# Patient Record
Sex: FEMALE | Race: WHITE | NOT HISPANIC OR LATINO | Employment: OTHER | URBAN - METROPOLITAN AREA
[De-identification: names, ages, dates, MRNs, and addresses within clinical notes are randomized per-mention and may not be internally consistent; named-entity substitution may affect disease eponyms.]

---

## 2023-03-31 DIAGNOSIS — G44.86 CERVICOGENIC HEADACHE: Primary | ICD-10-CM

## 2023-03-31 RX ORDER — BUTALBITAL, ACETAMINOPHEN AND CAFFEINE 300; 40; 50 MG/1; MG/1; MG/1
1 CAPSULE ORAL EVERY 6 HOURS PRN
Qty: 16 CAPSULE | Refills: 0 | Status: SHIPPED | OUTPATIENT
Start: 2023-03-31 | End: 2023-05-15 | Stop reason: SDUPTHER

## 2023-03-31 RX ORDER — BUTALBITAL, ACETAMINOPHEN AND CAFFEINE 300; 40; 50 MG/1; MG/1; MG/1
CAPSULE ORAL EVERY 6 HOURS
COMMUNITY
Start: 2020-09-15 | End: 2023-03-31 | Stop reason: SDUPTHER

## 2023-04-19 ENCOUNTER — APPOINTMENT (OUTPATIENT)
Dept: PRIMARY CARE | Facility: CLINIC | Age: 63
End: 2023-04-19
Payer: COMMERCIAL

## 2023-05-15 DIAGNOSIS — G44.86 CERVICOGENIC HEADACHE: ICD-10-CM

## 2023-05-15 RX ORDER — BUTALBITAL, ACETAMINOPHEN AND CAFFEINE 300; 40; 50 MG/1; MG/1; MG/1
1 CAPSULE ORAL EVERY 6 HOURS PRN
Qty: 16 CAPSULE | Refills: 0 | Status: SHIPPED | OUTPATIENT
Start: 2023-05-15 | End: 2023-06-26 | Stop reason: SDUPTHER

## 2023-06-26 DIAGNOSIS — G44.86 CERVICOGENIC HEADACHE: ICD-10-CM

## 2023-06-26 RX ORDER — BUTALBITAL, ACETAMINOPHEN AND CAFFEINE 300; 40; 50 MG/1; MG/1; MG/1
1 CAPSULE ORAL EVERY 6 HOURS PRN
Qty: 16 CAPSULE | Refills: 0 | Status: SHIPPED | OUTPATIENT
Start: 2023-06-26 | End: 2023-08-08 | Stop reason: SDUPTHER

## 2023-07-07 LAB
CHOLESTEROL (MG/DL) IN SER/PLAS: 98 MG/DL (ref 0–199)
CHOLESTEROL IN HDL (MG/DL) IN SER/PLAS: 18.4 MG/DL
CHOLESTEROL/HDL RATIO: 5.3
LDL: 46 MG/DL (ref 0–99)
TRIGLYCERIDE (MG/DL) IN SER/PLAS: 166 MG/DL (ref 0–149)
VLDL: 33 MG/DL (ref 0–40)

## 2023-08-04 DIAGNOSIS — R21 RASH: Primary | ICD-10-CM

## 2023-08-04 RX ORDER — FLUCONAZOLE 150 MG/1
150 TABLET ORAL
COMMUNITY
Start: 2022-12-07 | End: 2023-08-08 | Stop reason: SDUPTHER

## 2023-08-04 RX ORDER — CETIRIZINE HYDROCHLORIDE AND PSEUDOEPHEDRINE HYDROCHLORIDE 5; 120 MG/1; MG/1
TABLET, FILM COATED, EXTENDED RELEASE ORAL
COMMUNITY
Start: 2023-07-28

## 2023-08-04 RX ORDER — FLUOCINONIDE GEL 0.5 MG/G
1 GEL TOPICAL 2 TIMES DAILY
COMMUNITY
Start: 2022-12-07 | End: 2023-08-04 | Stop reason: SDUPTHER

## 2023-08-06 RX ORDER — FLUOCINONIDE GEL 0.5 MG/G
1 GEL TOPICAL 2 TIMES DAILY
Qty: 30 G | Refills: 1 | Status: SHIPPED | OUTPATIENT
Start: 2023-08-06

## 2023-08-07 DIAGNOSIS — B37.31 ACUTE CANDIDIASIS OF VULVA AND VAGINA: ICD-10-CM

## 2023-08-07 DIAGNOSIS — G44.86 CERVICOGENIC HEADACHE: ICD-10-CM

## 2023-08-07 DIAGNOSIS — B37.9 YEAST INFECTION: Primary | ICD-10-CM

## 2023-08-07 RX ORDER — NYSTATIN 100000 U/G
CREAM TOPICAL
Qty: 30 G | Refills: 1 | Status: SHIPPED | OUTPATIENT
Start: 2023-08-07 | End: 2023-08-08

## 2023-08-08 RX ORDER — BUTALBITAL, ACETAMINOPHEN AND CAFFEINE 300; 40; 50 MG/1; MG/1; MG/1
1 CAPSULE ORAL EVERY 6 HOURS PRN
Qty: 16 CAPSULE | Refills: 0 | OUTPATIENT
Start: 2023-08-08

## 2023-08-08 RX ORDER — FLUCONAZOLE 150 MG/1
150 TABLET ORAL ONCE
Qty: 1 TABLET | Refills: 0 | Status: SHIPPED | OUTPATIENT
Start: 2023-08-08 | End: 2023-08-08

## 2023-08-08 NOTE — TELEPHONE ENCOUNTER
Patient left a message requesting her meds through our refill line. No reason was given for the fluconazole refill request.     Last seen 12/2022.

## 2023-08-10 DIAGNOSIS — G44.86 CERVICOGENIC HEADACHE: ICD-10-CM

## 2023-08-10 RX ORDER — BUTALBITAL, ACETAMINOPHEN AND CAFFEINE 300; 40; 50 MG/1; MG/1; MG/1
1 CAPSULE ORAL EVERY 6 HOURS PRN
Qty: 16 CAPSULE | Refills: 0 | Status: SHIPPED | OUTPATIENT
Start: 2023-08-10

## 2023-08-14 LAB
ABO GROUP (TYPE) IN BLOOD: NORMAL
ABO GROUP (TYPE) IN BLOOD: NORMAL
ANTIBODY SCREEN: NORMAL
RH FACTOR: NORMAL
RH FACTOR: NORMAL

## 2023-08-16 LAB
BB ANTIBODY IDENTIFICATION: NORMAL
PATH REV-IMMUNOHEMATOLOGY-PR30: NORMAL

## 2023-09-01 PROBLEM — M99.09 SEGMENTAL AND SOMATIC DYSFUNCTION: Status: ACTIVE | Noted: 2023-09-01

## 2023-09-01 PROBLEM — G43.909 HEADACHE, MIGRAINE: Status: ACTIVE | Noted: 2023-09-01

## 2023-09-01 PROBLEM — J30.9 ALLERGIC RHINITIS DUE TO ALLERGEN: Status: ACTIVE | Noted: 2023-09-01

## 2023-09-01 PROBLEM — K21.9 GERD (GASTROESOPHAGEAL REFLUX DISEASE): Status: ACTIVE | Noted: 2023-09-01

## 2023-09-01 PROBLEM — R10.9 ABDOMINAL WALL PAIN: Status: ACTIVE | Noted: 2023-09-01

## 2023-09-01 PROBLEM — M54.50 CHRONIC BILATERAL LOW BACK PAIN WITHOUT SCIATICA: Status: ACTIVE | Noted: 2023-09-01

## 2023-09-01 PROBLEM — G44.229 TENSION HEADACHE, CHRONIC: Status: ACTIVE | Noted: 2023-09-01

## 2023-09-01 PROBLEM — M99.01 CERVICOTHORACIC SOMATIC DYSFUNCTION: Status: ACTIVE | Noted: 2023-09-01

## 2023-09-01 PROBLEM — R11.0 NAUSEA IN ADULT: Status: ACTIVE | Noted: 2023-09-01

## 2023-09-01 PROBLEM — G89.29 CHRONIC BILATERAL LOW BACK PAIN WITHOUT SCIATICA: Status: ACTIVE | Noted: 2023-09-01

## 2023-09-01 PROBLEM — R91.8 PULMONARY NODULES: Status: ACTIVE | Noted: 2023-09-01

## 2023-09-01 PROBLEM — K81.0 CHOLECYSTITIS, ACUTE: Status: ACTIVE | Noted: 2023-09-01

## 2023-09-01 PROBLEM — M41.9 SCOLIOSIS: Status: ACTIVE | Noted: 2023-09-01

## 2023-09-01 PROBLEM — M54.2 NECK PAIN: Status: ACTIVE | Noted: 2023-09-01

## 2023-09-01 PROBLEM — K83.9 BILE LEAK: Status: ACTIVE | Noted: 2023-09-01

## 2023-09-01 PROBLEM — R19.7 DIARRHEA: Status: ACTIVE | Noted: 2023-09-01

## 2023-09-01 PROBLEM — E63.9 POOR DIET: Status: ACTIVE | Noted: 2023-09-01

## 2023-09-01 PROBLEM — D64.9 ANEMIA: Status: ACTIVE | Noted: 2023-09-01

## 2023-09-01 PROBLEM — R16.1 SPLENOMEGALY: Status: ACTIVE | Noted: 2023-09-01

## 2023-09-01 PROBLEM — G44.86 HEADACHE, CERVICOGENIC: Status: ACTIVE | Noted: 2023-09-01

## 2023-09-01 PROBLEM — Z86.2 HISTORY OF LYMPHOPENIA: Status: ACTIVE | Noted: 2023-09-01

## 2023-09-01 PROBLEM — D61.818 PANCYTOPENIA (MULTI): Status: ACTIVE | Noted: 2023-09-01

## 2023-09-01 PROBLEM — M54.6 THORACIC SPINE PAIN: Status: ACTIVE | Noted: 2023-09-01

## 2023-09-01 PROBLEM — E66.3 OVERWEIGHT (BMI 25.0-29.9): Status: ACTIVE | Noted: 2023-09-01

## 2023-09-01 PROBLEM — L03.90 CELLULITIS: Status: ACTIVE | Noted: 2023-09-01

## 2023-09-01 PROBLEM — L30.9 ECZEMA: Status: ACTIVE | Noted: 2023-09-01

## 2023-09-01 PROBLEM — G89.3 CHRONIC PAIN DUE TO MALIGNANT NEOPLASTIC DISEASE: Status: ACTIVE | Noted: 2022-03-22

## 2023-09-01 PROBLEM — C26.1: Status: ACTIVE | Noted: 2023-09-01

## 2023-09-01 PROBLEM — R10.811 RIGHT UPPER QUADRANT ABDOMINAL TENDERNESS WITHOUT REBOUND TENDERNESS: Status: ACTIVE | Noted: 2023-09-01

## 2023-09-01 PROBLEM — K82.2 PERFORATION OF GALLBLADDER: Status: ACTIVE | Noted: 2022-03-23

## 2023-09-01 PROBLEM — B37.31 VAGINAL CANDIDIASIS: Status: ACTIVE | Noted: 2023-09-01

## 2023-09-01 RX ORDER — OXYCODONE HYDROCHLORIDE 10 MG/1
TABLET ORAL
COMMUNITY
Start: 2023-07-28 | End: 2023-10-16 | Stop reason: SDUPTHER

## 2023-09-01 RX ORDER — NYSTATIN 100000 U/G
CREAM TOPICAL
COMMUNITY
Start: 2022-01-03

## 2023-09-01 RX ORDER — OMEPRAZOLE 20 MG/1
1 CAPSULE, DELAYED RELEASE ORAL DAILY PRN
COMMUNITY
Start: 2021-05-03

## 2023-09-01 RX ORDER — OXYCODONE HYDROCHLORIDE 5 MG/1
5 TABLET ORAL EVERY 6 HOURS PRN
COMMUNITY
Start: 2021-12-23 | End: 2024-03-02

## 2023-09-01 RX ORDER — FAMOTIDINE 40 MG/1
1 TABLET, FILM COATED ORAL DAILY
COMMUNITY
Start: 2022-12-12

## 2023-09-01 RX ORDER — ONDANSETRON 4 MG/1
4 TABLET, FILM COATED ORAL EVERY 8 HOURS PRN
COMMUNITY

## 2023-09-01 RX ORDER — DEXAMETHASONE 0.5 MG/5ML
SOLUTION ORAL
COMMUNITY
Start: 2023-07-07

## 2023-09-01 RX ORDER — METOCLOPRAMIDE 5 MG/1
TABLET ORAL
COMMUNITY

## 2023-09-01 RX ORDER — CHLORHEXIDINE GLUCONATE 4 %
LIQUID (ML) TOPICAL
COMMUNITY

## 2023-09-01 RX ORDER — GABAPENTIN 300 MG/1
300 CAPSULE ORAL 2 TIMES DAILY
COMMUNITY
End: 2023-10-27 | Stop reason: SDUPTHER

## 2023-09-01 RX ORDER — GLUC/MSM/COLGN2/HYAL/ANTIARTH3 375-375-20
TABLET ORAL
COMMUNITY

## 2023-09-01 RX ORDER — ONDANSETRON HYDROCHLORIDE 8 MG/1
8 TABLET, FILM COATED ORAL EVERY 8 HOURS
COMMUNITY
Start: 2022-12-07 | End: 2024-05-09 | Stop reason: ALTCHOICE

## 2023-09-01 RX ORDER — ACETAMINOPHEN 500 MG
TABLET ORAL
COMMUNITY

## 2023-09-01 RX ORDER — DOXYCYCLINE 100 MG/1
1 CAPSULE ORAL 2 TIMES DAILY
COMMUNITY
Start: 2023-02-04

## 2023-09-01 RX ORDER — ZINC SULFATE 50(220)MG
CAPSULE ORAL
COMMUNITY

## 2023-09-01 RX ORDER — FLUCONAZOLE 150 MG/1
150 TABLET ORAL
COMMUNITY
Start: 2022-01-03 | End: 2024-05-01 | Stop reason: SDUPTHER

## 2023-09-01 RX ORDER — FOLIC ACID 0.4 MG
TABLET ORAL
COMMUNITY

## 2023-09-01 RX ORDER — ASCORBIC ACID 250 MG
TABLET ORAL
COMMUNITY

## 2023-09-01 RX ORDER — MOXIFLOXACIN HYDROCHLORIDE 400 MG/1
400 TABLET ORAL DAILY
COMMUNITY
Start: 2023-07-28

## 2023-09-01 RX ORDER — DIPHENOXYLATE HYDROCHLORIDE AND ATROPINE SULFATE 2.5; .025 MG/1; MG/1
TABLET ORAL
COMMUNITY
Start: 2023-03-17

## 2023-10-06 ENCOUNTER — APPOINTMENT (OUTPATIENT)
Dept: HEMATOLOGY/ONCOLOGY | Facility: HOSPITAL | Age: 63
End: 2023-10-06
Payer: COMMERCIAL

## 2023-10-10 ENCOUNTER — APPOINTMENT (OUTPATIENT)
Dept: PALLIATIVE MEDICINE | Facility: CLINIC | Age: 63
End: 2023-10-10
Payer: COMMERCIAL

## 2023-10-16 ENCOUNTER — OFFICE VISIT (OUTPATIENT)
Dept: PALLIATIVE MEDICINE | Facility: CLINIC | Age: 63
End: 2023-10-16
Payer: COMMERCIAL

## 2023-10-16 VITALS
BODY MASS INDEX: 23.79 KG/M2 | WEIGHT: 113.32 LBS | SYSTOLIC BLOOD PRESSURE: 121 MMHG | HEART RATE: 99 BPM | HEIGHT: 58 IN | TEMPERATURE: 96.3 F | OXYGEN SATURATION: 98 % | RESPIRATION RATE: 16 BRPM | DIASTOLIC BLOOD PRESSURE: 81 MMHG

## 2023-10-16 DIAGNOSIS — C85.90 NON-HODGKIN LYMPHOMA, UNSPECIFIED, UNSPECIFIED SITE (MULTI): ICD-10-CM

## 2023-10-16 DIAGNOSIS — G89.3 CANCER RELATED PAIN: Primary | ICD-10-CM

## 2023-10-16 DIAGNOSIS — Z51.5 PALLIATIVE CARE ENCOUNTER: ICD-10-CM

## 2023-10-16 PROCEDURE — 99215 OFFICE O/P EST HI 40 MIN: CPT

## 2023-10-16 PROCEDURE — 1036F TOBACCO NON-USER: CPT

## 2023-10-16 RX ORDER — OXYCODONE HYDROCHLORIDE 10 MG/1
10 TABLET ORAL EVERY 4 HOURS PRN
Qty: 150 TABLET | Refills: 0 | Status: SHIPPED | OUTPATIENT
Start: 2023-10-16 | End: 2023-11-15 | Stop reason: SDUPTHER

## 2023-10-16 ASSESSMENT — PAIN SCALES - GENERAL: PAINLEVEL: 7

## 2023-10-16 ASSESSMENT — ENCOUNTER SYMPTOMS
DEPRESSION: 0
OCCASIONAL FEELINGS OF UNSTEADINESS: 0
LOSS OF SENSATION IN FEET: 0

## 2023-10-16 ASSESSMENT — PATIENT HEALTH QUESTIONNAIRE - PHQ9
SUM OF ALL RESPONSES TO PHQ9 QUESTIONS 1 AND 2: 0
2. FEELING DOWN, DEPRESSED OR HOPELESS: NOT AT ALL
1. LITTLE INTEREST OR PLEASURE IN DOING THINGS: NOT AT ALL

## 2023-10-16 NOTE — PROGRESS NOTES
SUPPORTIVE AND PALLIATIVE ONCOLOGY OUTPATIENT FOLLOW-UP      SERVICE DATE: 10/16/2023    Subjective   HISTORY OF PRESENT ILLNESS: Grace Smith is a 63 y.o. 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  Oncology has discussed splenectomy versus radiation to decrease or eliminate the spleen completely.  This has been put on hold due to complications from cholecystectomy.  She had struggled with biliary leaks, which had her in and out of the hospital;  this has improved greatly. Methotrexate has been discontinued r/t side effects.     Pain Assessment:  Pain Score: 8-9/10 at its worst and 4/10 with medication.   Location: abdominal pain and headaches  Description: She reports headaches that started after her last blood transfusion. She said it was not a complete match and she has not been feeling well ever since. She has headaches several days per week that are pressure and aching. She has been taking Fioricet with relief. She continues to have abdominal pain that is aching and sharp. This is not constant. She has been taking oxycodone 10 mg 4-5 times per day and Gabapentin 300 mg BID.    Symptom Assessment:  Pain:somewhat  Headache: somewhat  Dizziness:none  Lack of energy: somewhat. Feels that she is not able to complete any activities without feeling drained.   Difficulty sleeping: none  Worrying: none  Anxiety: a little  Depression: a little  Pain in mouth/swallowing: none  Dry mouth: none  Taste changes: none  Shortness of breath: somewhat with exertion  Lack of appetite: somewhat   Nausea: a little. This is improving overall.   Vomiting: none  Constipation: none  Diarrhea: none  Sore muscles: none  Numbness or tingling in hands/feet/other: none  Weight loss: none  Other: none      Information obtained from: chart review and interview of  "patient  ______________________________________________________________________        Objective        PHYSICAL EXAMINATION   Vital signs reviewed      10/16/2023     3:16 PM   Vitals   Systolic 121   Diastolic 81   Heart Rate 99   Temp 35.7 °C (96.3 °F)   Resp 16   Height (in) 1.48 m (4' 10.27\")    Weight (lb) 113.32   BMI 23.47 kg/m2   BSA (m2) 1.45 m2   Visit Report Report         Physical Exam  Pulmonary:      Effort: Pulmonary effort is normal.   Musculoskeletal:         General: Normal range of motion.   Skin:     General: Skin is warm.   Neurological:      Mental Status: She is alert and oriented to person, place, and time.   Psychiatric:         Mood and Affect: Mood normal.         Behavior: Behavior normal.       ASSESSMENT/PLAN    Pain  Pain is: cancer related pain  Type: somatic  Pain control: well-controlled  Home regimen:   -Continue 300 mg gabapentin 2 times a day. Can't tolerate higher doses; grogginess does seem to be minimal now.  -Continue 10 mg oxycodone as needed every 4 hours.  Using about 4-5 tablets a day.  -Continue Tylenol as needed.  Do not exceed 3000 mg a day.  Intolerances/previously tried:   -Was previously seen by chronic pain and would not like to go back.  -Has appointment with [facility]. Seeing chiropractor there.    Opioid Use  Medication Management:   - OARRS report reviewed with no aberrant behavior; consistent with  prescriptions/records and patient history  - MED 60-75.  Overdose Risk Score 640.   This has been discussed with patient.   - We will continue to closely monitor the patient for signs of prescription misuse including UDS, OARRS review and subjective reports at each visit.  - No concurrent benzodiazepine use   - I am a provider who either is or has consulted and collaborated with a provider certified in Hospice and Palliative Medicine and have conducted a face-face visit and examination for this patient.  - Routine Urine Drug Screen completed 1/6/23 " appropriately positive for prescribed opioids and negative for illicit substances. Repeat yearly.  - Controlled Substance Agreement completed 4/11/23. Renew yearly.  - Specifically discussed that controlled substance prescriptions will only be provided by our group as outlined in the completed agreement  - Prescribed naloxone at hospital discharge in 2/2022  - Red Flags: None     Nausea   Intermittent nausea without vomiting related to  blood transfusion    -Continue Zofran as needed. She would like dissolvable zofran at next fill.   -Stop Prilosec daily due to Methotrexate.  -Can use 20-40mg Pepcid daily for GERD.    Constipation  At risk for constipation related to opioids,  currently not constipated  Usual bowel pattern: daily  Home regimen:   -Continue Senna-S as needed  -At home she keeps regular by drinking apple juice when she is constipated  -Continue Lomotil as needed for diarrhea  -Can use benefiber daily for diarrhea  LBM today     SOB: Only with exertion.  -Use fan on your face when feeling short of breath.    Sleep:  - Gabapentin is effective for sleep    Supportive and Palliative Oncology encounter:  Spoke with patient at bedside  Emotional support provided  Coordination of care  We will continue to follow and address symptoms as needed    Advance Directives  Existence of Advance Directives:Unknown  Decision maker: Surrogate decision maker is daughter Elvira  Code Status: Full code    Next Follow-Up Visit:  Return to clinic in 3 months    Signature and billing  Medical complexity was low level due to due to complexity of problems, extensive data review, and high risk of management/treatment.  Time was spent on the following: Prep Time, Time Directly with Patient/Family/Caregiver, Documentation Time. Total time spent: 40      Data  Diagnostic tests and information reviewed for today's visit:  Conversation with primary team         Some elements copied from Milly peter on 7/18/23, the elements  have been updated and all reflect current decision making from today, 10/16/2023.      Plan of Care discussed with: Patient    SIGNATURE: CHARLES Giron    Contact information:  Supportive and Palliative Oncology  Monday-Friday 8 AM-5 PM  Phone:  [phone], press option #5, then option #1.   Or Epic Secure Chat

## 2023-10-26 ENCOUNTER — APPOINTMENT (OUTPATIENT)
Dept: RADIATION ONCOLOGY | Facility: HOSPITAL | Age: 63
End: 2023-10-26
Payer: COMMERCIAL

## 2023-10-27 DIAGNOSIS — G89.3 CANCER RELATED PAIN: Primary | ICD-10-CM

## 2023-10-27 RX ORDER — GABAPENTIN 300 MG/1
300 CAPSULE ORAL 2 TIMES DAILY
Qty: 60 CAPSULE | Refills: 2 | Status: SHIPPED | OUTPATIENT
Start: 2023-10-27 | End: 2024-01-30 | Stop reason: SDUPTHER

## 2023-11-02 ENCOUNTER — APPOINTMENT (OUTPATIENT)
Dept: RADIATION ONCOLOGY | Facility: HOSPITAL | Age: 63
End: 2023-11-02
Payer: COMMERCIAL

## 2023-11-15 ENCOUNTER — TELEPHONE (OUTPATIENT)
Dept: ADMISSION | Facility: HOSPITAL | Age: 63
End: 2023-11-15
Payer: COMMERCIAL

## 2023-11-15 DIAGNOSIS — G89.3 CANCER RELATED PAIN: ICD-10-CM

## 2023-11-15 RX ORDER — OXYCODONE HYDROCHLORIDE 10 MG/1
10 TABLET ORAL EVERY 4 HOURS PRN
Qty: 150 TABLET | Refills: 0 | Status: SHIPPED | OUTPATIENT
Start: 2023-11-15 | End: 2023-12-08 | Stop reason: SDUPTHER

## 2023-11-15 NOTE — TELEPHONE ENCOUNTER
Patient last seen by CONOR Oviedo on 10/16 with plan to continue oxycodone 10mg every 4 hours PRN. Follow up visit is scheduled for 1/8. OARRS reviewed and no aberrancy noted. Patient last filled oxycodone 10mg #150/30 days on 10/17. Prescription to be pended to provider.

## 2023-12-08 ENCOUNTER — TELEPHONE (OUTPATIENT)
Dept: HEMATOLOGY/ONCOLOGY | Facility: HOSPITAL | Age: 63
End: 2023-12-08
Payer: COMMERCIAL

## 2023-12-08 DIAGNOSIS — G89.3 CANCER RELATED PAIN: ICD-10-CM

## 2023-12-08 RX ORDER — OXYCODONE HYDROCHLORIDE 10 MG/1
10 TABLET ORAL EVERY 4 HOURS PRN
Qty: 150 TABLET | Refills: 0 | Status: SHIPPED | OUTPATIENT
Start: 2023-12-08 | End: 2024-01-05 | Stop reason: SDUPTHER

## 2023-12-08 NOTE — TELEPHONE ENCOUNTER
Per OARRS, last fill was for 130 tabs/21 day supply on 11/17.  Refill to be pended to provider to approve.

## 2023-12-08 NOTE — TELEPHONE ENCOUNTER
Refill request received for Oxycodone 10mg.  Preferred pharmacy is [facility] at [address] [address].  Message sent to Palliative Care team.

## 2024-01-05 ENCOUNTER — TELEPHONE (OUTPATIENT)
Dept: ADMISSION | Facility: HOSPITAL | Age: 64
End: 2024-01-05
Payer: COMMERCIAL

## 2024-01-05 DIAGNOSIS — G89.3 CANCER RELATED PAIN: ICD-10-CM

## 2024-01-05 RX ORDER — OXYCODONE HYDROCHLORIDE 10 MG/1
10 TABLET ORAL EVERY 4 HOURS PRN
Qty: 150 TABLET | Refills: 0 | Status: SHIPPED | OUTPATIENT
Start: 2024-01-05 | End: 2024-01-30 | Stop reason: SDUPTHER

## 2024-01-05 NOTE — TELEPHONE ENCOUNTER
OARRS reviewed and no aberrancy noted. Prescription pended to provider to approve. FUV 1/8 with Milly Oviedo.

## 2024-01-08 ENCOUNTER — APPOINTMENT (OUTPATIENT)
Dept: PALLIATIVE MEDICINE | Facility: CLINIC | Age: 64
End: 2024-01-08
Payer: COMMERCIAL

## 2024-01-16 ENCOUNTER — APPOINTMENT (OUTPATIENT)
Dept: PALLIATIVE MEDICINE | Facility: CLINIC | Age: 64
End: 2024-01-16
Payer: COMMERCIAL

## 2024-01-23 ENCOUNTER — APPOINTMENT (OUTPATIENT)
Dept: PALLIATIVE MEDICINE | Facility: CLINIC | Age: 64
End: 2024-01-23
Payer: COMMERCIAL

## 2024-01-30 ENCOUNTER — TELEPHONE (OUTPATIENT)
Dept: HEMATOLOGY/ONCOLOGY | Facility: CLINIC | Age: 64
End: 2024-01-30

## 2024-01-30 ENCOUNTER — OFFICE VISIT (OUTPATIENT)
Dept: PALLIATIVE MEDICINE | Facility: CLINIC | Age: 64
End: 2024-01-30
Payer: COMMERCIAL

## 2024-01-30 VITALS
HEART RATE: 98 BPM | RESPIRATION RATE: 18 BRPM | BODY MASS INDEX: 23.24 KG/M2 | DIASTOLIC BLOOD PRESSURE: 77 MMHG | OXYGEN SATURATION: 100 % | WEIGHT: 112.21 LBS | TEMPERATURE: 98.1 F | SYSTOLIC BLOOD PRESSURE: 124 MMHG

## 2024-01-30 DIAGNOSIS — Z51.5 PALLIATIVE CARE ENCOUNTER: ICD-10-CM

## 2024-01-30 DIAGNOSIS — G89.3 CANCER RELATED PAIN: ICD-10-CM

## 2024-01-30 PROCEDURE — 99214 OFFICE O/P EST MOD 30 MIN: CPT

## 2024-01-30 PROCEDURE — 1036F TOBACCO NON-USER: CPT

## 2024-01-30 RX ORDER — OXYCODONE HYDROCHLORIDE 10 MG/1
10 TABLET ORAL EVERY 4 HOURS PRN
Qty: 150 TABLET | Refills: 0 | Status: SHIPPED | OUTPATIENT
Start: 2024-01-30 | End: 2024-03-02

## 2024-01-30 RX ORDER — GABAPENTIN 300 MG/1
300 CAPSULE ORAL 2 TIMES DAILY
Qty: 60 CAPSULE | Refills: 2 | Status: SHIPPED | OUTPATIENT
Start: 2024-01-30 | End: 2024-05-01 | Stop reason: SDUPTHER

## 2024-01-30 ASSESSMENT — PAIN SCALES - GENERAL: PAINLEVEL: 0-NO PAIN

## 2024-01-30 NOTE — PROGRESS NOTES
SUPPORTIVE AND PALLIATIVE ONCOLOGY OUTPATIENT FOLLOW-UP      SERVICE DATE: 1/30/2024    Subjective   HISTORY OF PRESENT ILLNESS: Grace Smith is a 63 y.o. 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 Oncology has discussed splenectomy versus radiation to decrease or eliminate the spleen completely.  This has been put on hold due to complications from cholecystectomy.  She had struggled with biliary leaks, which had her in and out of the hospital;  this has improved greatly. Methotrexate has been discontinued r/t side effects.     Unfortunately, she has not followed up with oncology as recommended.         Pain Assessment:  Pain Score: 3/10  Location:  abdominal pain, neck, back (chronic)  Description: dull ache, non radiating   Education:  Current pain regimen     Symptom Assessment:  Pain:a little  Headache: none  Dizziness:none  Lack of energy: a little  Difficulty sleeping: none  Worrying: none  Anxiety: none  Depression: none  Pain in mouth/swallowing: none  Dry mouth: none  Taste changes: none  Shortness of breath: none  Lack of appetite: none   Nausea: none  Vomiting: none  Constipation: none  Diarrhea: none  Sore muscles: none  Numbness or tingling in hands/feet/other: none  Weight loss: none    Information obtained from: interview of patient  ______________________________________________________________________        Objective        PHYSICAL EXAMINATION   Vital Signs:   Vital signs reviewed  Vitals:    01/30/24 1440   BP: 124/77   Pulse: 98   Resp: 18   Temp: 36.7 °C (98.1 °F)   SpO2: 100%        Physical Exam  HENT:      Head: Normocephalic and atraumatic.      Nose: Nose normal.      Mouth/Throat:      Mouth: Mucous membranes are moist.      Pharynx: Oropharynx is clear.   Eyes:      General: Scleral icterus present.      Extraocular Movements: Extraocular movements  intact.      Pupils: Pupils are equal, round, and reactive to light.   Pulmonary:      Effort: Pulmonary effort is normal.   Abdominal:      General: There is distension.      Palpations: There is splenomegaly.      Tenderness: There is no abdominal tenderness.      Comments: firm   Musculoskeletal:         General: No swelling. Normal range of motion.      Cervical back: Normal range of motion and neck supple.   Skin:     General: Skin is warm and dry.      Coloration: Skin is jaundiced.   Neurological:      General: No focal deficit present.      Mental Status: She is alert.   Psychiatric:         Mood and Affect: Mood normal.         Behavior: Behavior normal.         ASSESSMENT/PLAN    Pain  Pain is: cancer related pain  Type: somatic  Pain control: well-controlled  Home regimen:   -Continue 300 mg gabapentin 2 times a day. Can't tolerate higher doses; grogginess does seem to be minimal now.  -Continue 10 mg oxycodone as needed every 4 hours.  Using about 4-5 tablets a day.  Intolerances/previously tried:   -Was previously seen by chronic pain and would not like to go back.     Opioid Use  Medication Management:   - OARRS report reviewed with no aberrant behavior; consistent with  prescriptions/records and patient history  - MED 60-75.  Overdose Risk Score 220.   This has been discussed with patient.   - We will continue to closely monitor the patient for signs of prescription misuse including UDS, OARRS review and subjective reports at each visit.  - No concurrent benzodiazepine use   - I am a provider who either is or has consulted and collaborated with a provider certified in Hospice and Palliative Medicine and have conducted a face-face visit and examination for this patient.  - Routine Urine Drug Screen completed 1/6/23 appropriately positive for prescribed opioids and negative for illicit substances. Repeat yearly. Complete at next visit.   - Controlled Substance Agreement completed 4/11/23. Renew  yearly. Complete at next visit.   - Specifically discussed that controlled substance prescriptions will only be provided by our group as outlined in the completed agreement  - Prescribed naloxone at hospital discharge in 2/2022  - Red Flags: None      Nausea  At risk for nausea without vomiting related to opioids   -Continue Zofran as needed.      Constipation  At risk for constipation related to opioids,  currently not constipated  Usual bowel pattern: daily  Home regimen:   -Continue Senna-S as needed     Sleep:  - Gabapentin is effective for sleep     Supportive and Palliative Oncology encounter:  Spoke with patient at bedside  Emotional support provided  Coordination of care  We will continue to follow and address symptoms as needed     Advance Directives  Existence of Advance Directives:Unknown  Decision maker: Surrogate decision maker is daughter Elvira  Code Status: Full code    Patient presents with jaundice and slight scleral icterus along with abdominal distention/firmness today. She has a history of elevated bilirubin but no recent labs or oncology appointments. ER evaluation was recommended for further evaluation. Discussed potential for life threatening etiology. She declined ER evaluation.  It was advised that she make an appointment with her oncologist ASAMARIALUISA. Dr. Ceja was notified.      Next Follow-Up Visit:  Return to clinic in 3 months     Signature and billing  Medical complexity was moderate level due to due to complexity of problems, extensive data review, and high risk of management/treatment.  Time was spent on the following: Prep Time, Time Directly with Patient/Family/Caregiver, Documentation Time. Total time spent: 30 minutes     Data  Diagnostic tests and information reviewed for today's visit:  Most recent labs and imaging results       Some elements copied from Supportive Oncology note on 10/16/23, the elements have been updated and all reflect current decision making from today,  1/30/2024.      Plan of Care discussed with: Patient and RN    I have personally seen and examined the patient and performed the medical decision-making components.  I have reviewed the Advanced Practice Registered Nurse (APRN) orientee's documentation and verified the findings in the note as written. Edits/additions made where necessary.     SIGNATURE: Radha Francisco, APRN-CNP    Contact information:  Supportive and Palliative Oncology  Monday-Friday 8 AM-5 PM  Phone:  [phone], press option #5, then option #1.   Or Epic Secure Chat

## 2024-01-30 NOTE — TELEPHONE ENCOUNTER
VM  Patient transferred from [facility] staff.  Patient saw Milly Oviedo today at [facility].  She wants patient to see Dr. Ceja next available as soon as possible.  Patient is jaundice and last labs bilirubin was elevated.  Definitely due for lab work.  Please call patient to discuss.

## 2024-01-30 NOTE — TELEPHONE ENCOUNTER
I spoke with Grace, she states that while she was seeing Milly Oviedo today, Milly noticed that the patient had yellowing in the cheeks and she recommended coming in to see Dr. Ceja. Grace states that she has no other new symptoms other than her baseline symptoms like controlled diarrhea and nausea, but she does feel that she is due for blood work since her last bilirubin was elevated back in September. She was agreeable to coming in on 2/1/24 at 10:30 AM with Jian ATKINS. She was appreciative of the call and had no other questions. She is aware that if any new symptoms arise prior to her visit to please call the clinic.

## 2024-02-01 ENCOUNTER — OFFICE VISIT (OUTPATIENT)
Dept: HEMATOLOGY/ONCOLOGY | Facility: CLINIC | Age: 64
End: 2024-02-01
Payer: COMMERCIAL

## 2024-02-01 ENCOUNTER — LAB (OUTPATIENT)
Dept: LAB | Facility: CLINIC | Age: 64
End: 2024-02-01
Payer: COMMERCIAL

## 2024-02-01 VITALS
BODY MASS INDEX: 23.1 KG/M2 | HEART RATE: 101 BPM | OXYGEN SATURATION: 98 % | RESPIRATION RATE: 16 BRPM | SYSTOLIC BLOOD PRESSURE: 120 MMHG | TEMPERATURE: 97.3 F | DIASTOLIC BLOOD PRESSURE: 67 MMHG | WEIGHT: 111.55 LBS

## 2024-02-01 DIAGNOSIS — D61.818 PANCYTOPENIA (MULTI): ICD-10-CM

## 2024-02-01 DIAGNOSIS — C91.Z0 LARGE GRANULAR LYMPHOCYTIC LEUKEMIA (MULTI): ICD-10-CM

## 2024-02-01 DIAGNOSIS — D61.818 PANCYTOPENIA (MULTI): Primary | ICD-10-CM

## 2024-02-01 DIAGNOSIS — D64.9 ANEMIA, UNSPECIFIED TYPE: ICD-10-CM

## 2024-02-01 DIAGNOSIS — D64.9 ANEMIA, UNSPECIFIED TYPE: Primary | ICD-10-CM

## 2024-02-01 LAB
ABO GROUP (TYPE) IN BLOOD: NORMAL
ALBUMIN SERPL BCP-MCNC: 3.8 G/DL (ref 3.4–5)
ALP SERPL-CCNC: 60 U/L (ref 33–136)
ALT SERPL W P-5'-P-CCNC: 7 U/L (ref 7–45)
ANION GAP SERPL CALC-SCNC: 12 MMOL/L (ref 10–20)
ANTIBODY SCREEN: NORMAL
AST SERPL W P-5'-P-CCNC: 27 U/L (ref 9–39)
BASOPHILS # BLD AUTO: 0.01 X10*3/UL (ref 0–0.1)
BASOPHILS NFR BLD AUTO: ABNORMAL %
BILIRUB SERPL-MCNC: 3 MG/DL (ref 0–1.2)
BUN SERPL-MCNC: 15 MG/DL (ref 6–23)
CALCIUM SERPL-MCNC: 8.7 MG/DL (ref 8.6–10.3)
CHLORIDE SERPL-SCNC: 98 MMOL/L (ref 98–107)
CO2 SERPL-SCNC: 28 MMOL/L (ref 21–32)
CREAT SERPL-MCNC: 0.64 MG/DL (ref 0.5–1.05)
EGFRCR SERPLBLD CKD-EPI 2021: >90 ML/MIN/1.73M*2
EOSINOPHIL # BLD AUTO: 0 X10*3/UL (ref 0–0.7)
EOSINOPHIL NFR BLD AUTO: ABNORMAL %
ERYTHROCYTE [DISTWIDTH] IN BLOOD BY AUTOMATED COUNT: 18.4 % (ref 11.5–14.5)
GLUCOSE SERPL-MCNC: 100 MG/DL (ref 74–99)
HCT VFR BLD AUTO: 20.9 % (ref 36–46)
HGB BLD-MCNC: 6.5 G/DL (ref 12–16)
IMM GRANULOCYTES # BLD AUTO: ABNORMAL 10*3/UL
IMM GRANULOCYTES NFR BLD AUTO: ABNORMAL %
LYMPHOCYTES # BLD AUTO: 0.27 X10*3/UL (ref 1.2–4.8)
LYMPHOCYTES NFR BLD AUTO: ABNORMAL %
MCH RBC QN AUTO: 29.3 PG (ref 26–34)
MCHC RBC AUTO-ENTMCNC: 31.1 G/DL (ref 32–36)
MCV RBC AUTO: 94 FL (ref 80–100)
MONOCYTES # BLD AUTO: 0.14 X10*3/UL (ref 0.1–1)
MONOCYTES NFR BLD AUTO: ABNORMAL %
NEUTROPHILS # BLD AUTO: 0.06 X10*3/UL (ref 1.2–7.7)
NEUTROPHILS NFR BLD AUTO: ABNORMAL %
NRBC BLD-RTO: ABNORMAL /100{WBCS}
PLATELET # BLD AUTO: 89 X10*3/UL (ref 150–450)
POLYCHROMASIA BLD QL SMEAR: NORMAL
POTASSIUM SERPL-SCNC: 4.4 MMOL/L (ref 3.5–5.3)
PROT SERPL-MCNC: 6.4 G/DL (ref 6.4–8.2)
RBC # BLD AUTO: 2.22 X10*6/UL (ref 4–5.2)
RBC MORPH BLD: NORMAL
RH FACTOR (ANTIGEN D): NORMAL
SODIUM SERPL-SCNC: 134 MMOL/L (ref 136–145)
WBC # BLD AUTO: 0.5 X10*3/UL (ref 4.4–11.3)

## 2024-02-01 PROCEDURE — 86880 COOMBS TEST DIRECT: CPT

## 2024-02-01 PROCEDURE — 36415 COLL VENOUS BLD VENIPUNCTURE: CPT

## 2024-02-01 PROCEDURE — 99214 OFFICE O/P EST MOD 30 MIN: CPT | Mod: 27

## 2024-02-01 PROCEDURE — 85025 COMPLETE CBC W/AUTO DIFF WBC: CPT

## 2024-02-01 PROCEDURE — 80053 COMPREHEN METABOLIC PANEL: CPT

## 2024-02-01 PROCEDURE — 86922 COMPATIBILITY TEST ANTIGLOB: CPT

## 2024-02-01 PROCEDURE — 1036F TOBACCO NON-USER: CPT

## 2024-02-01 PROCEDURE — 86901 BLOOD TYPING SEROLOGIC RH(D): CPT

## 2024-02-01 PROCEDURE — 99214 OFFICE O/P EST MOD 30 MIN: CPT

## 2024-02-01 PROCEDURE — 86900 BLOOD TYPING SEROLOGIC ABO: CPT

## 2024-02-01 RX ORDER — DIPHENHYDRAMINE HYDROCHLORIDE 50 MG/ML
50 INJECTION INTRAMUSCULAR; INTRAVENOUS AS NEEDED
Status: CANCELLED | OUTPATIENT
Start: 2024-02-01

## 2024-02-01 RX ORDER — ALBUTEROL SULFATE 0.83 MG/ML
3 SOLUTION RESPIRATORY (INHALATION) AS NEEDED
Status: CANCELLED | OUTPATIENT
Start: 2024-02-01

## 2024-02-01 RX ORDER — HEPARIN SODIUM,PORCINE/PF 10 UNIT/ML
50 SYRINGE (ML) INTRAVENOUS AS NEEDED
OUTPATIENT
Start: 2024-02-02

## 2024-02-01 RX ORDER — EPINEPHRINE 0.3 MG/.3ML
0.3 INJECTION SUBCUTANEOUS EVERY 5 MIN PRN
Status: CANCELLED | OUTPATIENT
Start: 2024-02-01

## 2024-02-01 RX ORDER — ACETAMINOPHEN 325 MG/1
650 TABLET ORAL ONCE
Status: CANCELLED | OUTPATIENT
Start: 2024-02-01

## 2024-02-01 RX ORDER — DIPHENHYDRAMINE HCL 25 MG
25 CAPSULE ORAL ONCE
Status: CANCELLED | OUTPATIENT
Start: 2024-02-01

## 2024-02-01 RX ORDER — HEPARIN 100 UNIT/ML
500 SYRINGE INTRAVENOUS AS NEEDED
OUTPATIENT
Start: 2024-02-02

## 2024-02-01 RX ORDER — FAMOTIDINE 10 MG/ML
20 INJECTION INTRAVENOUS ONCE AS NEEDED
Status: CANCELLED | OUTPATIENT
Start: 2024-02-01

## 2024-02-01 ASSESSMENT — PAIN SCALES - GENERAL: PAINLEVEL: 5

## 2024-02-01 NOTE — PROGRESS NOTES
Patient ID: Grace Smith is a 63 y.o. female.    Subjective    HPI    Chief Complaint: Follow-up for pancytopenia   Interval History:    Ms. Grace Smith is a 64 y/o F who presents for follow-up for pancytopenia.   Blood work from 8/2/2022, showed stable cytopenias. White count 1.1, hemoglobin 9.9, and platelets 160,000. No differential from that time.   Patient is feeling well since her surgery. She denies any pain. She completed antibiotics 1 week ago and had her PICC line out last Friday. She takes omeprazole as needed for GI issues. Patient initially had leg swelling post surgery which resolved with  increased protein in her diet and compression stockings. No recent fevers, chills, nausea, vomiting, diarrhea, coughing, chest pain, or skin changes.    Interval Events  2/3/2023: Her ANC today is 150. Patient reports feeling exhausted. She notes being scratched by her puppy and is worried she will get an infection. Of note, she has had chronic diarrhea since her cholecystectomy.   3/17/2023: Today, patient reports having new aching foot pain that does become quite intense. The pain lasts for a few minutes then resolves. She is no longer on antibiotics, amoxicillin, for a recent sinus infection. She now feels better. Due to the  infection she delayed starting the methotrexate. Patient has been taking this for 2 weeks now. She does experience N/D and is taking Zofran and lomotil. Of note, is scheduled to see an infectious disease specialist next week.    4/15/2023: Phone Visit: Since last visit Grace was instructed by our office to increase her methotrexate to 15 mg once a week on 3/31/2023. Today she reports that she is doing well, however when she was prescribed doxycycline on 3/30/2023 by urgent care  (after a fall in her driveway due to tripping with subsequent knee abrasion that was inflamed) she was overall was not feeling well, she looked it up and saw that there is an interaction where  the methotrexate serum concentration can increase when taking  it with doxycycline.  Since stopping the doxycycline after the 10 day course she is feeling at her baseline.  She feels taking the antibiotic has led to her decrease in her ANC and white blood cell count. She is not having any fevers or chills, lightheaded/dizziness,  restlessness or confusion. Her nausea is stable and continues to be controlled with Zofran and diarrhea controlled with Lomotil.  She has established care with an infectious disease doctor but at this time there are no needed interventions. Her pain remains stable as well. She is under pain control management with [facility] care.    4/27/2023: A telephone visit (audio only) between the patient at home and the provider at [facility] was utilized to provide this telehealth service.  Patient presents for follow-up for pancytopenia. Currently on methotrexate. Blood work on 4/26/2023 shows WBC 0.8, hemoglobin 11.5, and platelets normal at 118. ANC is 0.38.   Since last visit, patient reports right-sided flank pain for the last 2 days. She states that the pain is stable. Denies any injury or trauma. She also reports increased fatigue for the last 4 days.     5/15/2023: A telephone visit (audio only) between the patient at home and the provider at [facility] was utilized to provide this telehealth service.  Patient presents for follow-up for pancytopenia. Blood work on 5/11/2023 showed a WBC of 1.2 with an absolute neutrophil count of 440. Hemoglobin is normal at 12.5. Platelets are improving to 117,000. Patient also had a urine test on 5/11/2203, which  did not show any evidence of infection. Of note, she did have a bilateral renal ultrasound on 5/5/2203 due to having new right-sided kidney pain and upper quadrant abdominal pain. No splenomegaly with unremarkable kidneys. Spleen enlarged to 22.2 cm.   Since last visit, patient reports that she is having  "urinary urgency. She also reports feeling fatigued. States she takes vitamin B12 daily and folate weekly. She is interested in being tested for COVID antibodies. No other complaints today.     6/12/23: A telephone visit (audio only) between the patient at home and the provider at [facility] was utilized to provide this telehealth service.  Patient presents for follow-up for pancytopenia. Most recent blood work on 6/10/2023, shows hemoglobin of 10.6, WBC 0.9 and neutrophils 240.   Since last visit, patient reports that she has felt \"crummy\" and exhausted on methotrexate. She reports new mouth sores that are affecting her appetite. She states that she continues to have migraines and nausea and diarrhea. She is treating with Zofran  and Imodium.    7/26/2023: A telephone visit (audio only) between the patient at home and the provider at [facility] was utilized to provide this telehealth service.  Patient presents for follow-up for pancytopenia. Since last visit, patient had a bone marrow biopsy on 7/7/2023, which showed increased interstitial CD8 positive T-cell lymphocytosis and similar to recent splenic biopsy. 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   Patient is requesting repeat scans with CT C/A/P. She would like a second opinion from Hematology. She would also like to meet with someone from [facility].     2/1/2024:   Patient presents for problem focused visit.  She saw palliative care 2 days ago who contacted us regarding the patient appearing jaundice.  Patient states that she has felt overall about the same since her last visit with our office which was July 2023.  She saw Dr. Tao in August who referred patient to radiation oncology, patient states she did not want to go and see the provider due to location ([facility]) and has not followed up with Dr. Tao.  Patient's symptoms include " fatigue, decreased appetite, early satiety, occasional night sweats not fully drenching, abdominal pain and back pain, and some shortness of breath with exertion.  She denies fevers, chills, falls, edema, cough, shortness of breath at rest or skin changes.  She denies recent infections, melena, hematochezia or other signs of bleeding.     ROS 14 points performed, See HPI for exceptions     PMHx: Scoliosis, dry eye syndrome    PSHx: S/p tonsillectomy, s/p cholecystectomy 3/18/2022     Social Hx: , no hx of smoking, illicit drug or EtOH use. Daughter is an L/D nurse at [employer]. No h/o occupational chemical exposures. She denies any history of substance misuse. 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 She  has 2 children and 4 stepchildren. She enjoys being outdoors, camping, reading, researching, art, and animals.     FMHx: +cyclic neutropenia (father, [place] age) and uncle. No hx of malignancies.      Objective    Visit Vitals  /67 (BP Location: Right arm)   Pulse 101   Temp 36.3 °C (97.3 °F)   Resp 16   Wt 50.6 kg (111 lb 8.8 oz)   SpO2 98%   BMI 23.10 kg/m²   Smoking Status Never   BSA 1.44 m²         Physical Exam  Constitutional:       General: She is not in acute distress.     Appearance: Normal appearance.   HENT:      Head: Normocephalic.      Mouth/Throat:      Mouth: Mucous membranes are moist.      Pharynx: No oropharyngeal exudate or posterior oropharyngeal erythema.   Eyes:      General: No scleral icterus.     Pupils: Pupils are equal, round, and reactive to light.   Cardiovascular:      Rate and Rhythm: Normal rate and regular rhythm.   Pulmonary:      Effort: Pulmonary effort is normal. No respiratory distress.      Breath sounds: Normal breath sounds. No wheezing.   Abdominal:      General: Bowel sounds are normal.      Comments: Enlarged Spleen   Musculoskeletal:         General: Normal range of motion.      Cervical back: Normal range of motion and neck supple.  "  Skin:     General: Skin is warm and dry.   Neurological:      General: No focal deficit present.      Mental Status: She is alert and oriented to person, place, and time. Mental status is at baseline.      Motor: No weakness.      Gait: Gait normal.   Psychiatric:         Mood and Affect: Mood normal.         Behavior: Behavior normal.         Judgment: Judgment normal.         Performance Status:  Symptomatic; fully ambulatory      Assessment/Plan     1. Pancytopenia   2. Splenomegaly   - Pancytopenia since at least 9/11/2020 (WBC 1.3k, hgb 10.5 with MCV=87; plt= 144k; on 10/2: WBC 1.2k with an ANC= 620, plt= 118k and hgb 8.9); CT abd/pelvis on 9/16/2020 revealed: \"Marked splenomegaly of uncertain etiology. There is also mild hepatomegaly.   - Bone marrow biopsy from 9/23/2020 revealed \"HYPERCELLULAR MARROW WITH ERYTHROID DOMINANT MATURING TRILINEAGE HEMATOPOIESIS WITH MILD DYSERYTHROPOIESIS, MILD FIBROSIS AND AN ATYPICAL T LYMPHOID INFILTRATE, NON-DIAGNOSTIC.\"  - Subsequent testing for HIV Ab, hep C Ab, and hep B (S Ag, Ab and coreAb) and CMV (IgG and IgM) all came back negative. EBV titer results came back as suggestive of a previous infection.  In addition, doppler uls of her RUQ on 10/2020 was unrevealing (\"Cholelithiasis. Unremarkable appearance of the liver. Severe splenomegaly. No definite hemodynamic changes to indicate portal hypertension. No evidence of portal venous thrombosis\") .   - She also had a PET/CT which revealed: \"Redemonstration of splenomegaly with mild hypermetabolic activity identified in the spleen and the bone marrow. Findings are nonspecific but can be seen in setting  of a lymphomatous process. However there is no focal hypermetabolic activity identified to suggest a neoplastic process\".  - Dr. Edmond previously told the pt and daughter that a splenectomy would probably be both diagnostic and therapeutic. In 2/2021 saw Dr. Tovar from surgery, who recommended that she consider having  " "a splenectomy; however, the pt has decided against this due to concerns about recovery from the procedure with her scoliosis. In addition, she has declined to receive pre-splenectomy vaccinations (due to concerns about some of the \"ingredients\"; also,  declined to receive COVID vaccinations).   - s/p cholecystectomy on 3/18/2022 that was complicated by abscess formation requiring drain and stent placement.  - She had a prolonged hospitalization and was eventually discharged with IV antibiotics. Is planned to continue until 5/15/2022.  - We discussed regarding her LGL, we will see where her counts fall s/p antibiotics and recovery from recent infections as this is likely suppressing her marrow in addition to underlying bone marrow process.  - Discussed treatment options for LGL and in her case, we would consider Methotrexate weekly to try to improve neutrophil count.  - We discussed overall, treatment would be supportive to try to prevent infections and need for transfusions.  - Ideally, her diagnosis has been unclear and we would have further tissue.  - Regarding her splenomegaly, she does have options with ablation therapy with IR and will reach out to  Dr. eVrnon.  - She is still very reluctant to undergo splenectomy although we have repeatedly discussed the benefit to understand her pathology better and alleviate sx from splenomegaly.  - 8/11/2022 - She has been off of antibiotics for 1 week with no recent infections.  - Counts remain stable.  - At this point, we discussed that I would be comfortable starting treatment for her LGL, however, she would like to consider possible ablation to the spleen and we will refer to Dr. Vernon who was going to see her initially. We will provide a face sheet  of his info today.  - Discussed that treatment would be methotrexate. We briefly went over side effects, however we will let her recover from her spleen procedure first.  - 2/3/2023 - started weekly methotrexate  - 7/7/23 " repeat bone marrow biopsy, still most consistent with LGL     7/26/23: Telephone call   - Patient is requesting CT scans. We will plan for CT C/A/P to assess spleen and her history of pulmonary nodules.   - Cytopenias remain unchanged.  - We reviewed bone marrow biopsy from 7/7/23 which does show most consistent with LGL. Previously tried methotrexate with no improvement and did not like side effects.   - She is requesting a second opinion form another hematologist We will refer to Dr. Tao.   - She also would like to meet with a functional medicine doctor, [facility] as she is not sure if she would like next line of treatment. We will refer to Dr. Pearson.   - still adamant against splenectomy  - RTC TBD.     2/1/2024:  - Presents for problem focused visit; Lester reported by palliative care provider two days ago   - Labs today: WBC 0.5, ANC 0.06, hgb 6.5, plt 89,000, creatinine 0.64, total bilirubin 3.0  - Saw Dr. Tao in Aug. 2023, decided to not have consult with rad/onc and has not followed up with Dr. Tao or our office since about that time  - Current symptoms include fatigue, early satiety, JAIME, abdominal and back pain, appetite changes  - Discussed with patient that we can refer her to a local radiation oncologist but she declines, states that she does not want to do any further appointments other than with Dr. Ceja, and plans to have her daughter accommodate her  - Hgb 6.5 we will arrange for 1 unit of pRBC transfusion   - We will have her RTC in 1 week to discuss labs/treatment plan with Dr. Ceja   - We did discuss risk of infection/neutropenia and to call right away for any signs or symptoms of infection   - She'd also like a referral to integrative oncology to discuss vitamins and supplements      2. Diarrhea  - Chronic for several years.  - Stool studies negative for pathogen, parasite.     3. Elevated LDH  - Likely related to pancytopenia, however no clear etiology at this time.     4. Mouth  sores  6/12/23:  - Patient is starting to have mouth sores likely secondary to severe neutropenia.   - Discussed mouth wash which we will prescribe today in combination with doxycycline 100 mg twice a day for 7 days.  - She will call us with any worsening symptoms.   - She knows to go to ER with any signs of fevers or chills.   - RTC 2 weeks after bone marrow biopsy.         Transfusion ordered for hgb 6.5  RTC 1 week to see Dr Ceja        Diagnoses and all orders for this visit:  Pancytopenia (CMS/HCC)  -     CBC and Auto Differential; Future  -     Comprehensive Metabolic Panel; Future  -     Referral to [facility]; Future  Large granular lymphocytic leukemia (CMS/HCC)  -     Referral to [facility]; Future  Anemia, unspecified type  -     Referral to [facility]; Future         CONOR Teran-CNP

## 2024-02-02 RX ORDER — DIPHENHYDRAMINE HCL 25 MG
25 CAPSULE ORAL ONCE
OUTPATIENT
Start: 2024-02-02

## 2024-02-02 RX ORDER — ALBUTEROL SULFATE 0.83 MG/ML
3 SOLUTION RESPIRATORY (INHALATION) AS NEEDED
OUTPATIENT
Start: 2024-02-02

## 2024-02-02 RX ORDER — ACETAMINOPHEN 325 MG/1
650 TABLET ORAL ONCE
OUTPATIENT
Start: 2024-02-02

## 2024-02-02 RX ORDER — DIPHENHYDRAMINE HYDROCHLORIDE 50 MG/ML
50 INJECTION INTRAMUSCULAR; INTRAVENOUS AS NEEDED
OUTPATIENT
Start: 2024-02-02

## 2024-02-02 RX ORDER — EPINEPHRINE 0.3 MG/.3ML
0.3 INJECTION SUBCUTANEOUS EVERY 5 MIN PRN
OUTPATIENT
Start: 2024-02-02

## 2024-02-02 RX ORDER — FAMOTIDINE 10 MG/ML
20 INJECTION INTRAVENOUS ONCE AS NEEDED
OUTPATIENT
Start: 2024-02-02

## 2024-02-03 ENCOUNTER — INFUSION (OUTPATIENT)
Dept: HEMATOLOGY/ONCOLOGY | Facility: HOSPITAL | Age: 64
End: 2024-02-03
Payer: COMMERCIAL

## 2024-02-03 VITALS
WEIGHT: 113.1 LBS | HEART RATE: 73 BPM | TEMPERATURE: 97.7 F | BODY MASS INDEX: 23.42 KG/M2 | SYSTOLIC BLOOD PRESSURE: 97 MMHG | OXYGEN SATURATION: 100 % | DIASTOLIC BLOOD PRESSURE: 55 MMHG | RESPIRATION RATE: 18 BRPM

## 2024-02-03 DIAGNOSIS — D64.9 ANEMIA, UNSPECIFIED TYPE: ICD-10-CM

## 2024-02-03 DIAGNOSIS — C91.Z0 LARGE GRANULAR LYMPHOCYTIC LEUKEMIA (MULTI): ICD-10-CM

## 2024-02-03 LAB
BLOOD EXPIRATION DATE: NORMAL
DISPENSE STATUS: NORMAL
PRODUCT BLOOD TYPE: 5100
PRODUCT CODE: NORMAL
UNIT ABO: NORMAL
UNIT NUMBER: NORMAL
UNIT RH: NORMAL
UNIT VOLUME: 274
XM INTEP: NORMAL

## 2024-02-03 PROCEDURE — 86978 RBC PRETREATMENT SERUM: CPT

## 2024-02-03 PROCEDURE — 36430 TRANSFUSION BLD/BLD COMPNT: CPT

## 2024-02-03 PROCEDURE — P9040 RBC LEUKOREDUCED IRRADIATED: HCPCS

## 2024-02-03 PROCEDURE — 86870 RBC ANTIBODY IDENTIFICATION: CPT

## 2024-02-03 PROCEDURE — 2500000001 HC RX 250 WO HCPCS SELF ADMINISTERED DRUGS (ALT 637 FOR MEDICARE OP)

## 2024-02-03 PROCEDURE — 86850 RBC ANTIBODY SCREEN: CPT

## 2024-02-03 RX ORDER — EPINEPHRINE 0.3 MG/.3ML
0.3 INJECTION SUBCUTANEOUS EVERY 5 MIN PRN
Status: DISCONTINUED | OUTPATIENT
Start: 2024-02-03 | End: 2024-02-03 | Stop reason: HOSPADM

## 2024-02-03 RX ORDER — FAMOTIDINE 10 MG/ML
20 INJECTION INTRAVENOUS ONCE AS NEEDED
Status: DISCONTINUED | OUTPATIENT
Start: 2024-02-03 | End: 2024-02-03 | Stop reason: HOSPADM

## 2024-02-03 RX ORDER — ACETAMINOPHEN 325 MG/1
650 TABLET ORAL ONCE
Status: COMPLETED | OUTPATIENT
Start: 2024-02-03 | End: 2024-02-03

## 2024-02-03 RX ORDER — DIPHENHYDRAMINE HYDROCHLORIDE 50 MG/ML
50 INJECTION INTRAMUSCULAR; INTRAVENOUS AS NEEDED
Status: DISCONTINUED | OUTPATIENT
Start: 2024-02-03 | End: 2024-02-03 | Stop reason: HOSPADM

## 2024-02-03 RX ORDER — DIPHENHYDRAMINE HCL 25 MG
25 CAPSULE ORAL ONCE
Status: COMPLETED | OUTPATIENT
Start: 2024-02-03 | End: 2024-02-03

## 2024-02-03 RX ORDER — ALBUTEROL SULFATE 0.83 MG/ML
3 SOLUTION RESPIRATORY (INHALATION) AS NEEDED
Status: DISCONTINUED | OUTPATIENT
Start: 2024-02-03 | End: 2024-02-03 | Stop reason: HOSPADM

## 2024-02-03 RX ADMIN — ACETAMINOPHEN 650 MG: 325 TABLET ORAL at 10:16

## 2024-02-03 RX ADMIN — DIPHENHYDRAMINE HYDROCHLORIDE 25 MG: 25 CAPSULE ORAL at 10:16

## 2024-02-03 ASSESSMENT — PAIN SCALES - GENERAL: PAINLEVEL: 5

## 2024-02-04 LAB
BB ANTIBODY IDENTIFICATION: NORMAL
CASE #: NORMAL

## 2024-02-04 NOTE — PROGRESS NOTES
Patient ID: Grace Smith is a 63 y.o. female.    Subjective    HPI  Ms. Grace Smith is a 62 y/o F who presents for follow-up for pancytopenia.   Blood work from 8/2/2022, showed stable cytopenias. White count 1.1, hemoglobin 9.9, and platelets 160,000. No differential from that time.   Patient is feeling well since her surgery. She denies any pain. She completed antibiotics 1 week ago and had her PICC line out last Friday. She takes omeprazole as needed for GI issues. Patient initially had leg swelling post surgery which resolved with  increased protein in her diet and compression stockings. No recent fevers, chills, nausea, vomiting, diarrhea, coughing, chest pain, or skin changes.     Interval Events    2/9/2024: Patient presents for follow-up for pancytopenia and splenomegaly.   Hgb 7.1. Now with tooth infection. No new fevers, chills. NO new abdominal pain but still with discomfort.       Patient's past medical history, surgical history, family history and social history reviewed.     Objective    BSA: 1.44 meters squared  /74   Pulse 93   Temp 36.7 °C (98.1 °F) (Temporal)   Resp 16   Wt 50.5 kg (111 lb 5.3 oz)   SpO2 97%   BMI 23.05 kg/m²      Review of Systems:   Review of Systems:    Positive per HPI, otherwise negative.     Physical Exam:   Constitutional: Patient appears in no acute distress.   Sitting comfortably in chair.  Eyes: EOMI, clear sclera  ENMT: mucous membranes moist, no apparent injury  Head/Neck: Neck supple, no JVD  Respiratory/Thorax: Patent airways, CTAB, normal  breath sounds, no increased work of breathing  Cardiovascular: Regular, rate and rhythm, no murmurs  Gastrointestinal: massive splenomegaly  Extremities: normal extremities, no cyanosis edema,  no swelling  Neurological: alert and oriented x3, nonfocal, normal  speech and hearing  Lymphatic: No palpable lymphadenopathy in cervical,  axillary  lymph nodes.  Spleen appears normal  "size.  Psychological: Appropriate mood and behavior, normal  affect  Skin: Warm and dry, no lesions, no rashes     Performance Status:  Symptomatic; fully ambulatory    Labs/Imaging/Pathology: personally reviewed reports and images in Epic electronic medical record system. Pertinent results as it related to the plan represented in below in assessment and plan.     Assessment/Plan   1. Pancytopenia   2. Splenomegaly   - Pancytopenia since at least 9/11/2020 (WBC 1.3k, hgb 10.5 with MCV=87; plt= 144k; on 10/2: WBC 1.2k with an ANC= 620, plt= 118k and hgb 8.9); CT abd/pelvis on 9/16/2020 revealed: \"Marked splenomegaly of uncertain etiology. There is also mild hepatomegaly.   - Bone marrow biopsy from 9/23/2020 revealed \"HYPERCELLULAR MARROW WITH ERYTHROID DOMINANT MATURING TRILINEAGE HEMATOPOIESIS WITH MILD DYSERYTHROPOIESIS, MILD FIBROSIS AND AN ATYPICAL T LYMPHOID INFILTRATE, NON-DIAGNOSTIC.\"  - Subsequent testing for HIV Ab, hep C Ab, and hep B (S Ag, Ab and coreAb) and CMV (IgG and IgM) all came back negative. EBV titer results came back as suggestive of a previous infection.  In addition, doppler uls of her RUQ on 10/2020 was unrevealing (\"Cholelithiasis. Unremarkable appearance of the liver. Severe splenomegaly. No definite hemodynamic changes to indicate portal hypertension. No evidence of portal venous thrombosis\") .   - She also had a PET/CT which revealed: \"Redemonstration of splenomegaly with mild hypermetabolic activity identified in the spleen and the bone marrow. Findings are nonspecific but can be seen in setting  of a lymphomatous process. However there is no focal hypermetabolic activity identified to suggest a neoplastic process\".  - Dr. Edmond previously told the pt and daughter that a splenectomy would probably be both diagnostic and therapeutic. In 2/2021 saw Dr. Tovar from surgery, who recommended that she consider having  a splenectomy; however, the pt has decided against this due to concerns " "about recovery from the procedure with her scoliosis. In addition, she has declined to receive pre-splenectomy vaccinations (due to concerns about some of the \"ingredients\"; also,  declined to receive COVID vaccinations).   - s/p cholecystectomy on 3/18/2022 that was complicated by abscess formation requiring drain and stent placement.  - She had a prolonged hospitalization and was eventually discharged with IV antibiotics. Is planned to continue until 5/15/2022.  - We discussed regarding her LGL, we will see where her counts fall s/p antibiotics and recovery from recent infections as this is likely suppressing her marrow in addition to underlying bone marrow process.  - Discussed treatment options for LGL and in her case, we would consider Methotrexate weekly to try to improve neutrophil count.  - We discussed overall, treatment would be supportive to try to prevent infections and need for transfusions.  - Ideally, her diagnosis has been unclear and we would have further tissue.  - Regarding her splenomegaly, she does have options with ablation therapy with IR and will reach out to  Dr. Vernon.  - She is still very reluctant to undergo splenectomy although we have repeatedly discussed the benefit to understand her pathology better and alleviate sx from splenomegaly.  - 8/11/2022 - She has been off of antibiotics for 1 week with no recent infections.  - Counts remain stable.  - At this point, we discussed that I would be comfortable starting treatment for her LGL, however, she would like to consider possible ablation to the spleen and we will refer to Dr. Vernon who was going to see her initially. We will provide a face sheet  of his info today.  - Discussed that treatment would be methotrexate. We briefly went over side effects, however we will let her recover from her spleen procedure first.  - 2/3/2023 - started weekly methotrexate  - 7/7/23 repeat bone marrow biopsy, still most consistent with LGL     7/26/23: " Telephone call   - Patient is requesting CT scans. We will plan for CT C/A/P to assess spleen and her history of pulmonary nodules.   - Cytopenias remain unchanged.  - We reviewed bone marrow biopsy from 7/7/23 which does show most consistent with LGL. Previously tried methotrexate with no improvement and did not like side effects.   - She is requesting a second opinion form another hematologist We will refer to Dr. Tao.   - She also would like to meet with a functional medicine doctor, [facility] as she is not sure if she would like next line of treatment. We will refer to Dr. Pearson.   - still adamant against splenectomy  - RTC TBD.      2/1/2024:  - Presents for problem focused visit; Erichice reported by palliative care provider two days ago   - Labs today: WBC 0.5, ANC 0.06, hgb 6.5, plt 89,000, creatinine 0.64, total bilirubin 3.0  - Saw Dr. Tao in Aug. 2023, decided to not have consult with rad/onc and has not followed up with Dr. Tao or our office since about that time  - Current symptoms include fatigue, early satiety, JAIME, abdominal and back pain, appetite changes  - Discussed with patient that we can refer her to a local radiation oncologist but she declines, states that she does not want to do any further appointments other than with Dr. Ceja, and plans to have her daughter accommodate her  - Hgb 6.5 we will arrange for 1 unit of pRBC transfusion   - We will have her RTC in 1 week to discuss labs/treatment plan with Dr. Ceja   - We did discuss risk of infection/neutropenia and to call right away for any signs or symptoms of infection   - She'd also like a referral to integrative oncology to discuss vitamins and supplements      2/9/24:  -continues to be pancytopenic with massive splenomegaly that is taking up the majority of her abdomen and now with no appetite and weight loss  - she is open to splenic ablation at this point and will discuss with Dr Vernon if this is an option still otherwise  can consider palliative RT  - amoxicillin prescribed for tooth infection  - we discussed ppx abx but she declined at this point and prefers to take as needed  - RTC after splenic ablation      RTC TBD. This note has been transcribed using a medical scribe and there is a possibility of unintentional typing misprints.      Diagnoses and all orders for this visit:  Chemotherapy induced nausea and vomiting  -     ondansetron ODT (Zofran-ODT) 8 mg disintegrating tablet; Take 1 tablet (8 mg) by mouth every 8 hours if needed for nausea or vomiting.  -     amoxicillin (Amoxil) 500 mg capsule; Take 1 capsule (500 mg) by mouth every 8 hours for 7 days.  -     CBC and Auto Differential; Future  -     Type and screen; Future    Cesilia Ceja MD  Hematology/Oncology  [facility]

## 2024-02-09 ENCOUNTER — LAB (OUTPATIENT)
Dept: LAB | Facility: CLINIC | Age: 64
End: 2024-02-09
Payer: COMMERCIAL

## 2024-02-09 ENCOUNTER — OFFICE VISIT (OUTPATIENT)
Dept: HEMATOLOGY/ONCOLOGY | Facility: CLINIC | Age: 64
End: 2024-02-09
Payer: COMMERCIAL

## 2024-02-09 VITALS
SYSTOLIC BLOOD PRESSURE: 123 MMHG | RESPIRATION RATE: 16 BRPM | TEMPERATURE: 98.1 F | DIASTOLIC BLOOD PRESSURE: 74 MMHG | HEART RATE: 93 BPM | BODY MASS INDEX: 23.05 KG/M2 | OXYGEN SATURATION: 97 % | WEIGHT: 111.33 LBS

## 2024-02-09 DIAGNOSIS — R11.2 CHEMOTHERAPY INDUCED NAUSEA AND VOMITING: Primary | ICD-10-CM

## 2024-02-09 DIAGNOSIS — D64.9 ANEMIA, UNSPECIFIED TYPE: ICD-10-CM

## 2024-02-09 DIAGNOSIS — T45.1X5A CHEMOTHERAPY INDUCED NAUSEA AND VOMITING: Primary | ICD-10-CM

## 2024-02-09 DIAGNOSIS — D61.818 PANCYTOPENIA (MULTI): ICD-10-CM

## 2024-02-09 DIAGNOSIS — C91.Z0 LARGE GRANULAR LYMPHOCYTIC LEUKEMIA (MULTI): ICD-10-CM

## 2024-02-09 LAB
ALBUMIN SERPL BCP-MCNC: 3.9 G/DL (ref 3.4–5)
ALP SERPL-CCNC: 61 U/L (ref 33–136)
ALT SERPL W P-5'-P-CCNC: 8 U/L (ref 7–45)
ANION GAP SERPL CALC-SCNC: 14 MMOL/L (ref 10–20)
AST SERPL W P-5'-P-CCNC: 28 U/L (ref 9–39)
BASO STIPL BLD QL SMEAR: PRESENT
BASOPHILS # BLD AUTO: 0.02 X10*3/UL (ref 0–0.1)
BASOPHILS NFR BLD AUTO: 3.4 %
BILIRUB SERPL-MCNC: 2.8 MG/DL (ref 0–1.2)
BUN SERPL-MCNC: 17 MG/DL (ref 6–23)
CALCIUM SERPL-MCNC: 8.8 MG/DL (ref 8.6–10.3)
CHLORIDE SERPL-SCNC: 96 MMOL/L (ref 98–107)
CO2 SERPL-SCNC: 26 MMOL/L (ref 21–32)
CREAT SERPL-MCNC: 0.74 MG/DL (ref 0.5–1.05)
DACRYOCYTES BLD QL SMEAR: NORMAL
EGFRCR SERPLBLD CKD-EPI 2021: >90 ML/MIN/1.73M*2
EOSINOPHIL # BLD AUTO: 0 X10*3/UL (ref 0–0.7)
EOSINOPHIL NFR BLD AUTO: 0 %
ERYTHROCYTE [DISTWIDTH] IN BLOOD BY AUTOMATED COUNT: 17.9 % (ref 11.5–14.5)
GIANT PLATELETS BLD QL SMEAR: NORMAL
GLUCOSE SERPL-MCNC: 111 MG/DL (ref 74–99)
HCT VFR BLD AUTO: 23.3 % (ref 36–46)
HGB BLD-MCNC: 7.1 G/DL (ref 12–16)
HYPOCHROMIA BLD QL SMEAR: NORMAL
IMM GRANULOCYTES # BLD AUTO: 0 X10*3/UL (ref 0–0.7)
IMM GRANULOCYTES NFR BLD AUTO: 0 % (ref 0–0.9)
LYMPHOCYTES # BLD AUTO: 0.34 X10*3/UL (ref 1.2–4.8)
LYMPHOCYTES NFR BLD AUTO: 58.6 %
MCH RBC QN AUTO: 28.4 PG (ref 26–34)
MCHC RBC AUTO-ENTMCNC: 30.5 G/DL (ref 32–36)
MCV RBC AUTO: 93 FL (ref 80–100)
MONOCYTES # BLD AUTO: 0.13 X10*3/UL (ref 0.1–1)
MONOCYTES NFR BLD AUTO: 22.4 %
NEUTROPHILS # BLD AUTO: 0.09 X10*3/UL (ref 1.2–7.7)
NEUTROPHILS NFR BLD AUTO: 15.6 %
NRBC BLD-RTO: ABNORMAL /100{WBCS}
OVALOCYTES BLD QL SMEAR: NORMAL
PATH REVIEW-CBC DIFFERENTIAL: NORMAL
PLATELET # BLD AUTO: 104 X10*3/UL (ref 150–450)
POLYCHROMASIA BLD QL SMEAR: NORMAL
POTASSIUM SERPL-SCNC: 4.2 MMOL/L (ref 3.5–5.3)
PROT SERPL-MCNC: 6.6 G/DL (ref 6.4–8.2)
RBC # BLD AUTO: 2.5 X10*6/UL (ref 4–5.2)
RBC MORPH BLD: NORMAL
SODIUM SERPL-SCNC: 132 MMOL/L (ref 136–145)
WBC # BLD AUTO: 0.6 X10*3/UL (ref 4.4–11.3)

## 2024-02-09 PROCEDURE — 1036F TOBACCO NON-USER: CPT | Performed by: INTERNAL MEDICINE

## 2024-02-09 PROCEDURE — 85060 BLOOD SMEAR INTERPRETATION: CPT | Performed by: PATHOLOGY

## 2024-02-09 PROCEDURE — 99214 OFFICE O/P EST MOD 30 MIN: CPT | Performed by: INTERNAL MEDICINE

## 2024-02-09 PROCEDURE — 80053 COMPREHEN METABOLIC PANEL: CPT

## 2024-02-09 PROCEDURE — 36415 COLL VENOUS BLD VENIPUNCTURE: CPT

## 2024-02-09 PROCEDURE — 85025 COMPLETE CBC W/AUTO DIFF WBC: CPT

## 2024-02-09 RX ORDER — ONDANSETRON 8 MG/1
8 TABLET, ORALLY DISINTEGRATING ORAL EVERY 8 HOURS PRN
Qty: 30 TABLET | Refills: 0 | Status: SHIPPED | OUTPATIENT
Start: 2024-02-09 | End: 2024-03-10

## 2024-02-09 RX ORDER — AMOXICILLIN 500 MG/1
500 CAPSULE ORAL EVERY 8 HOURS SCHEDULED
Qty: 21 CAPSULE | Refills: 0 | Status: SHIPPED | OUTPATIENT
Start: 2024-02-09 | End: 2024-02-16

## 2024-02-09 ASSESSMENT — PAIN SCALES - GENERAL: PAINLEVEL: 0-NO PAIN

## 2024-02-16 ENCOUNTER — LAB (OUTPATIENT)
Dept: LAB | Facility: LAB | Age: 64
End: 2024-02-16
Payer: COMMERCIAL

## 2024-02-16 DIAGNOSIS — R11.2 CHEMOTHERAPY INDUCED NAUSEA AND VOMITING: ICD-10-CM

## 2024-02-16 DIAGNOSIS — T45.1X5A CHEMOTHERAPY INDUCED NAUSEA AND VOMITING: ICD-10-CM

## 2024-02-16 LAB
BASOPHILS # BLD AUTO: 0.01 X10*3/UL (ref 0–0.1)
BASOPHILS NFR BLD AUTO: 1.9 %
EOSINOPHIL # BLD AUTO: 0 X10*3/UL (ref 0–0.7)
EOSINOPHIL NFR BLD AUTO: 0 %
ERYTHROCYTE [DISTWIDTH] IN BLOOD BY AUTOMATED COUNT: 18.4 % (ref 11.5–14.5)
HCT VFR BLD AUTO: 26 % (ref 36–46)
HGB BLD-MCNC: 8.1 G/DL (ref 12–16)
IMM GRANULOCYTES # BLD AUTO: 0 X10*3/UL (ref 0–0.7)
IMM GRANULOCYTES NFR BLD AUTO: 0 % (ref 0–0.9)
LYMPHOCYTES # BLD AUTO: 0.26 X10*3/UL (ref 1.2–4.8)
LYMPHOCYTES NFR BLD AUTO: 49.1 %
MCH RBC QN AUTO: 29.6 PG (ref 26–34)
MCHC RBC AUTO-ENTMCNC: 31.2 G/DL (ref 32–36)
MCV RBC AUTO: 95 FL (ref 80–100)
MONOCYTES # BLD AUTO: 0.12 X10*3/UL (ref 0.1–1)
MONOCYTES NFR BLD AUTO: 22.6 %
NEUTROPHILS # BLD AUTO: 0.14 X10*3/UL (ref 1.2–7.7)
NEUTROPHILS NFR BLD AUTO: 26.4 %
NRBC BLD-RTO: 0 /100 WBCS (ref 0–0)
PLATELET # BLD AUTO: 132 X10*3/UL (ref 150–450)
RBC # BLD AUTO: 2.74 X10*6/UL (ref 4–5.2)
WBC # BLD AUTO: 0.5 X10*3/UL (ref 4.4–11.3)

## 2024-02-16 PROCEDURE — 36415 COLL VENOUS BLD VENIPUNCTURE: CPT

## 2024-02-16 PROCEDURE — 85025 COMPLETE CBC W/AUTO DIFF WBC: CPT

## 2024-03-01 ENCOUNTER — TELEPHONE (OUTPATIENT)
Dept: HEMATOLOGY/ONCOLOGY | Facility: HOSPITAL | Age: 64
End: 2024-03-01
Payer: COMMERCIAL

## 2024-03-01 DIAGNOSIS — G89.3 CANCER RELATED PAIN: ICD-10-CM

## 2024-03-01 RX ORDER — OXYCODONE HYDROCHLORIDE 10 MG/1
10 TABLET ORAL EVERY 4 HOURS PRN
Qty: 150 TABLET | Refills: 0 | Status: CANCELLED | OUTPATIENT
Start: 2024-03-01 | End: 2024-03-31

## 2024-03-01 NOTE — TELEPHONE ENCOUNTER
Pt requesting refill  Oxycodone 10mg 1 tablet every 4 hrs prn  Pharmacy on file verified.   Last FUV 1/30 with next FUV 4/22.

## 2024-03-01 NOTE — TELEPHONE ENCOUNTER
OARRS reviewed and no aberrancy noted. Prescription pended to provider to approve. Patient updated.

## 2024-03-02 DIAGNOSIS — G89.3 CANCER RELATED PAIN: ICD-10-CM

## 2024-03-02 RX ORDER — OXYCODONE HYDROCHLORIDE 10 MG/1
10 TABLET ORAL EVERY 4 HOURS PRN
Qty: 150 TABLET | Refills: 0 | Status: SHIPPED | OUTPATIENT
Start: 2024-03-02 | End: 2024-03-28 | Stop reason: SDUPTHER

## 2024-03-28 ENCOUNTER — TELEPHONE (OUTPATIENT)
Dept: ADMISSION | Facility: HOSPITAL | Age: 64
End: 2024-03-28
Payer: COMMERCIAL

## 2024-03-28 DIAGNOSIS — G89.3 CANCER RELATED PAIN: ICD-10-CM

## 2024-03-28 RX ORDER — OXYCODONE HYDROCHLORIDE 10 MG/1
10 TABLET ORAL EVERY 4 HOURS PRN
Qty: 180 TABLET | Refills: 0 | Status: SHIPPED | OUTPATIENT
Start: 2024-03-28 | End: 2024-04-30 | Stop reason: SDUPTHER

## 2024-03-28 NOTE — TELEPHONE ENCOUNTER
OARRS report reviewed and reflects  prescription history, no aberrancy noted. Per OARRS, patient last filled Oxycodone IR 10 mg on 3/2/24, 25  day supply. Per last visit with Milly Barron 1/30/24 patient to continue medication. Patient with follow up visit scheduled with Milly 4/22/24 Patient updated that medication will be sent to [facility]. Refill request routed to provider.

## 2024-04-22 ENCOUNTER — APPOINTMENT (OUTPATIENT)
Dept: PALLIATIVE MEDICINE | Facility: CLINIC | Age: 64
End: 2024-04-22
Payer: COMMERCIAL

## 2024-04-29 ENCOUNTER — APPOINTMENT (OUTPATIENT)
Dept: PALLIATIVE MEDICINE | Facility: CLINIC | Age: 64
End: 2024-04-29
Payer: COMMERCIAL

## 2024-04-30 ENCOUNTER — DOCUMENTATION (OUTPATIENT)
Dept: PALLIATIVE MEDICINE | Facility: HOSPITAL | Age: 64
End: 2024-04-30

## 2024-04-30 ENCOUNTER — TELEPHONE (OUTPATIENT)
Dept: HEMATOLOGY/ONCOLOGY | Facility: CLINIC | Age: 64
End: 2024-04-30

## 2024-04-30 ENCOUNTER — LAB (OUTPATIENT)
Dept: LAB | Facility: CLINIC | Age: 64
End: 2024-04-30
Payer: COMMERCIAL

## 2024-04-30 ENCOUNTER — OFFICE VISIT (OUTPATIENT)
Dept: PALLIATIVE MEDICINE | Facility: CLINIC | Age: 64
End: 2024-04-30
Payer: COMMERCIAL

## 2024-04-30 VITALS
WEIGHT: 110.2 LBS | HEART RATE: 99 BPM | RESPIRATION RATE: 16 BRPM | TEMPERATURE: 97.5 F | BODY MASS INDEX: 22.82 KG/M2 | OXYGEN SATURATION: 98 % | DIASTOLIC BLOOD PRESSURE: 82 MMHG | SYSTOLIC BLOOD PRESSURE: 134 MMHG

## 2024-04-30 DIAGNOSIS — R30.0 DYSURIA: Primary | ICD-10-CM

## 2024-04-30 DIAGNOSIS — R35.0 URINARY FREQUENCY: ICD-10-CM

## 2024-04-30 DIAGNOSIS — G89.3 CANCER RELATED PAIN: ICD-10-CM

## 2024-04-30 DIAGNOSIS — R30.0 DYSURIA: ICD-10-CM

## 2024-04-30 LAB
APPEARANCE UR: ABNORMAL
BILIRUB UR STRIP.AUTO-MCNC: NEGATIVE MG/DL
COLOR UR: ABNORMAL
GLUCOSE UR STRIP.AUTO-MCNC: NORMAL MG/DL
HYALINE CASTS #/AREA URNS AUTO: ABNORMAL /LPF
KETONES UR STRIP.AUTO-MCNC: NEGATIVE MG/DL
LEUKOCYTE ESTERASE UR QL STRIP.AUTO: ABNORMAL
NITRITE UR QL STRIP.AUTO: NEGATIVE
PH UR STRIP.AUTO: 5.5 [PH]
PROT UR STRIP.AUTO-MCNC: ABNORMAL MG/DL
RBC # UR STRIP.AUTO: ABNORMAL /UL
RBC #/AREA URNS AUTO: ABNORMAL /HPF
SP GR UR STRIP.AUTO: 1.03
UROBILINOGEN UR STRIP.AUTO-MCNC: ABNORMAL MG/DL
WBC #/AREA URNS AUTO: ABNORMAL /HPF

## 2024-04-30 PROCEDURE — 81001 URINALYSIS AUTO W/SCOPE: CPT

## 2024-04-30 PROCEDURE — 99213 OFFICE O/P EST LOW 20 MIN: CPT

## 2024-04-30 PROCEDURE — 87086 URINE CULTURE/COLONY COUNT: CPT | Mod: STJLAB

## 2024-04-30 RX ORDER — OXYCODONE HYDROCHLORIDE 10 MG/1
10 TABLET ORAL EVERY 4 HOURS PRN
Qty: 180 TABLET | Refills: 0 | Status: SHIPPED | OUTPATIENT
Start: 2024-04-30 | End: 2024-05-28 | Stop reason: SDUPTHER

## 2024-04-30 ASSESSMENT — PAIN SCALES - GENERAL: PAINLEVEL: 0-NO PAIN

## 2024-04-30 NOTE — TELEPHONE ENCOUNTER
I spoke with Grace, she states that for the past week she has had burning when urinating, frequency and only passing small amounts when peeing. She states that she feels pressure in the bladder area, its hard to start a flow when peeing and there is a little pinkness when wiping on the tissue after urinating. She is denying any foul smells, fevers or chills. She states that she does have nausea at baseline that is unchanged and it is managed with Zofran. I notified her that Jian SUAREZ was updated and a urinalysis with culture will be ordered;Grace stated that she will stop by the outpt lab to have this done today. She is aware that the clinic will be notified of her results and she will be updated accordingly. She gave verbal understanding and was appreciative of the call. Nothing further needed at this time.

## 2024-04-30 NOTE — TELEPHONE ENCOUNTER
Per Bibi HEDRICK: Patient showed up at  at [facility] asking for urine test to see if patient has UTI.  Was seen in [place] and was told Urgent care doesn't take her insurance.

## 2024-04-30 NOTE — PROGRESS NOTES
SUPPORTIVE AND PALLIATIVE ONCOLOGY OUTPATIENT FOLLOW-UP      SERVICE DATE: 4/30/2024    Subjective   HISTORY OF PRESENT ILLNESS: Grace Smith is a 64 y.o. 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 Oncology has discussed splenectomy versus radiation to decrease or eliminate the spleen completely.  This has been put on hold due to complications from cholecystectomy.  She had struggled with biliary leaks, which had her in and out of the hospital;  this has improved greatly. Methotrexate has been discontinued r/t side effects.     Pain Assessment:  Pain Score: 5/10  Location:  abdominal pain, neck, back (chronic)  Description: dull ache, gnawing pain. The pain is intermittent. States the pain has increased since January. She reports the pain an 8/10 at its worst and improves to a 5/10 with oxycodone. She has been taking 5-6 tablets per day with good relief.       Symptom Assessment:  Pain:a little  Headache: none  Dizziness:none  Lack of energy: a little  Difficulty sleeping: none  Worrying: none  Anxiety: none  Depression: none  Pain in mouth/swallowing: none  Dry mouth: none  Taste changes: none  Shortness of breath: none  Lack of appetite: none   Nausea: a little using ondansetron  Vomiting: none  Constipation: none  Diarrhea: a little using Imodium as needed   Sore muscles: none  Numbness or tingling in hands/feet/other: none  Weight loss: none    Information obtained from: interview of patient  ______________________________________________________________________        Objective        PHYSICAL EXAMINATION   Vital Signs:   Vital signs reviewed  Vitals:    04/30/24 0958   BP: 134/82   Pulse: 99   Resp: 16   Temp: 36.4 °C (97.5 °F)   SpO2: 98%        Physical Exam  HENT:      Head: Normocephalic and atraumatic.      Nose: Nose normal.      Mouth/Throat:      Mouth: Mucous  membranes are moist.      Pharynx: Oropharynx is clear.   Eyes:      General: Scleral icterus present.      Extraocular Movements: Extraocular movements intact.      Pupils: Pupils are equal, round, and reactive to light.   Pulmonary:      Effort: Pulmonary effort is normal.   Abdominal:      General: There is distension.      Palpations: There is splenomegaly.      Tenderness: There is no abdominal tenderness.      Comments: firm   Musculoskeletal:         General: No swelling. Normal range of motion.      Cervical back: Normal range of motion and neck supple.   Skin:     General: Skin is warm and dry.      Coloration: Skin is jaundiced.   Neurological:      General: No focal deficit present.      Mental Status: She is alert.   Psychiatric:         Mood and Affect: Mood normal.         Behavior: Behavior normal.       No visits with results within 1 Month(s) from this visit.   Latest known visit with results is:   Lab on 02/16/2024   Component Date Value Ref Range Status    WBC 02/16/2024 0.5 (LL)  4.4 - 11.3 x10*3/uL Final    nRBC 02/16/2024 0.0  0.0 - 0.0 /100 WBCs Final    RBC 02/16/2024 2.74 (L)  4.00 - 5.20 x10*6/uL Final    Hemoglobin 02/16/2024 8.1 (L)  12.0 - 16.0 g/dL Final    Hematocrit 02/16/2024 26.0 (L)  36.0 - 46.0 % Final    MCV 02/16/2024 95  80 - 100 fL Final    MCH 02/16/2024 29.6  26.0 - 34.0 pg Final    MCHC 02/16/2024 31.2 (L)  32.0 - 36.0 g/dL Final    RDW 02/16/2024 18.4 (H)  11.5 - 14.5 % Final    Platelets 02/16/2024 132 (L)  150 - 450 x10*3/uL Final    Neutrophils % 02/16/2024 26.4  40.0 - 80.0 % Final    Immature Granulocytes %, Automated 02/16/2024 0.0  0.0 - 0.9 % Final    Immature Granulocyte Count (IG) includes promyelocytes, myelocytes and metamyelocytes but does not include bands. Percent differential counts (%) should be interpreted in the context of the absolute cell counts (cells/UL).    Lymphocytes % 02/16/2024 49.1  13.0 - 44.0 % Final    Monocytes % 02/16/2024 22.6  2.0 - 10.0  % Final    Eosinophils % 02/16/2024 0.0  0.0 - 6.0 % Final    Basophils % 02/16/2024 1.9  0.0 - 2.0 % Final    Neutrophils Absolute 02/16/2024 0.14 (L)  1.20 - 7.70 x10*3/uL Final    Percent differential counts (%) should be interpreted in the context of the absolute cell counts (cells/uL).    Immature Granulocytes Absolute, Au* 02/16/2024 0.00  0.00 - 0.70 x10*3/uL Final    Lymphocytes Absolute 02/16/2024 0.26 (L)  1.20 - 4.80 x10*3/uL Final    Monocytes Absolute 02/16/2024 0.12  0.10 - 1.00 x10*3/uL Final    Eosinophils Absolute 02/16/2024 0.00  0.00 - 0.70 x10*3/uL Final    Basophils Absolute 02/16/2024 0.01  0.00 - 0.10 x10*3/uL Final       ASSESSMENT/PLAN    Pain  Pain is: cancer related pain  Type: somatic  Pain control: well-controlled  Home regimen:   -Continue 300 mg gabapentin 2 times a day. Can't tolerate higher doses; grogginess does seem to be minimal now.  -Continue 10 mg oxycodone as needed every 4 hours.  Using about 4-5 tablets a day.  Intolerances/previously tried:   -Was previously seen by chronic pain and would not like to go back.     Opioid Use  Medication Management:   - OARRS report reviewed with no aberrant behavior; consistent with  prescriptions/records and patient history  - MED 60-75.  Overdose Risk Score 220.   This has been discussed with patient.   - We will continue to closely monitor the patient for signs of prescription misuse including UDS, OARRS review and subjective reports at each visit.  - No concurrent benzodiazepine use   - I am a provider who either is or has consulted and collaborated with a provider certified in Hospice and Palliative Medicine and have conducted a face-face visit and examination for this patient.  - Routine Urine Drug Screen completed 1/6/23 appropriately positive for prescribed opioids and negative for illicit substances. Repeat yearly. Complete at next visit as patient could not void 4/30/24.   - Controlled Substance Agreement completed 4/30/24  -  Specifically discussed that controlled substance prescriptions will only be provided by our group as outlined in the completed agreement  - Prescribed naloxone at hospital discharge in 2/2022  - Red Flags: None      Nausea  At risk for nausea without vomiting related to opioids   -Continue Zofran as needed.      Constipation  At risk for constipation related to opioids,  currently not constipated  Usual bowel pattern: daily  Home regimen:   -Continue Senna-S as needed  -Continue Imodium 2 mg with each episode of diarrhea - do not exceed 16 mg/day     Sleep:  - Gabapentin is effective for sleep     Supportive and Palliative Oncology encounter:  Spoke with patient at bedside  Emotional support provided  Coordination of care  We will continue to follow and address symptoms as needed     Advance Directives  Existence of Advance Directives:Unknown  Decision maker: Surrogate decision maker is daughter Elvira  Code Status: Full code     Next Follow-Up Visit:  Return to clinic in 3 months     Signature and billing  Medical complexity was moderate level due to due to complexity of problems, extensive data review, and high risk of management/treatment.  Time was spent on the following: Prep Time, Time Directly with Patient/Family/Caregiver, Documentation Time. Total time spent: 30 minutes     Data  Diagnostic tests and information reviewed for today's visit:  Most recent labs and imaging results       Some elements copied from Supportive Oncology note on 1/30/24, the elements have been updated and all reflect current decision making from today, 4/30/2024.      Plan of Care discussed with: Patient and RN    I have personally seen and examined the patient and performed the medical decision-making components.  I have reviewed the Advanced Practice Registered Nurse (APRN) orientee's documentation and verified the findings in the note as written. Edits/additions made where necessary.     SIGNATURE: Milly Barron,  APRN-CNP    Contact information:  Supportive and Palliative Oncology  Monday-Friday 8 AM-5 PM  Phone:  [phone], press option #5, then option #1.   Or Epic Secure Chat

## 2024-05-01 ENCOUNTER — TELEPHONE (OUTPATIENT)
Dept: PALLIATIVE MEDICINE | Facility: HOSPITAL | Age: 64
End: 2024-05-01
Payer: COMMERCIAL

## 2024-05-01 DIAGNOSIS — G89.3 CANCER RELATED PAIN: ICD-10-CM

## 2024-05-01 DIAGNOSIS — N30.00 ACUTE CYSTITIS WITHOUT HEMATURIA: ICD-10-CM

## 2024-05-01 DIAGNOSIS — R16.1 SPLENOMEGALY: ICD-10-CM

## 2024-05-01 DIAGNOSIS — B37.31 VAGINAL CANDIDIASIS: Primary | ICD-10-CM

## 2024-05-01 LAB
BACTERIA UR CULT: NORMAL
HOLD SPECIMEN: NORMAL

## 2024-05-01 RX ORDER — FLUCONAZOLE 150 MG/1
150 TABLET ORAL ONCE
Qty: 2 TABLET | Refills: 0 | Status: SHIPPED | OUTPATIENT
Start: 2024-05-01 | End: 2024-05-01

## 2024-05-01 RX ORDER — GABAPENTIN 300 MG/1
300 CAPSULE ORAL 2 TIMES DAILY
Qty: 60 CAPSULE | Refills: 2 | Status: SHIPPED | OUTPATIENT
Start: 2024-05-01

## 2024-05-01 RX ORDER — MICONAZOLE NITRATE 1200MG-2%
KIT VAGINAL ONCE
Qty: 1 KIT | Refills: 0 | Status: SHIPPED | OUTPATIENT
Start: 2024-05-01 | End: 2024-05-01

## 2024-05-01 RX ORDER — NITROFURANTOIN 25; 75 MG/1; MG/1
100 CAPSULE ORAL 2 TIMES DAILY
Qty: 10 CAPSULE | Refills: 0 | Status: SHIPPED | OUTPATIENT
Start: 2024-05-01 | End: 2024-05-06

## 2024-05-01 NOTE — PROGRESS NOTES
Called and spoke to patient regarding UA results and starting and ABX while we await the culture. She has no ABX preference. She reports yeast infection symptoms and requested treatment for this which was prescribed due to risk of infection furthering while on ABX, she requested intravaginal and oral treatment. We will await her urine cx and call if changes to ABX needs to be made.     Spoke to patient 5/2/2024 at approximately 1423:   I spoke to patient regarding her urine culture results that showed no significant growth.  Discussed that since her UA did have abnormal findings I recommend she follow-up with her primary care doctor and patient stated that she is due to see her as it has been over a year.  In addition patient has not yet started the antibiotic and therefore she will not start it but she will use the medications for possible yeast infection and monitor if symptoms get worse or other symptoms occur and notify us if so.  Symptoms include burning and frequency however at this point no fevers or chills.  Patient stated that she would like us to help her get an appointment scheduled with Dr. Oh so I will reach out to scheduling to see if they can give her a call to get this scheduled.  I asked patient to call us if she has any issues hearing from somebody to schedule this appointment or if she has any other questions or concerns in the meantime.    New order for Dr. Vernon referral entered per Dr. Ceja's note and patient stating she did not receive call previously.

## 2024-05-01 NOTE — TELEPHONE ENCOUNTER
----- Message from Grace Smith sent at 4/30/2024  5:34 PM EDT -----  Regarding: Gabapentin refill  Contact: [phone]  Jone Atkins,   I'm out of refills for the Gabapentin.  Please send over a renewal with refills to [facility].  I really appreciate it.   Thanks Milly!

## 2024-05-01 NOTE — TELEPHONE ENCOUNTER
OARRS report reviewed and reflects  prescription history, no aberrancy noted. Per OARRS, patient last filled Gabapentin #60/30  day supply on 3/29/24. Per last visit with Milly Barron NP on 4/30/24 patient to continue above medication. Patient with follow up visit scheduled with Milly Barron NP on 7/23/24.  Refill request routed to provider.

## 2024-05-09 DIAGNOSIS — C91.Z0 LARGE GRANULAR LYMPHOCYTIC LEUKEMIA (MULTI): Primary | ICD-10-CM

## 2024-05-09 RX ORDER — ONDANSETRON HYDROCHLORIDE 8 MG/1
8 TABLET, FILM COATED ORAL EVERY 8 HOURS
Qty: 30 TABLET | Refills: 1 | Status: SHIPPED | OUTPATIENT
Start: 2024-05-09

## 2024-05-10 DIAGNOSIS — G89.3 CANCER RELATED PAIN: ICD-10-CM

## 2024-05-10 DIAGNOSIS — C91.Z0 LARGE GRANULAR LYMPHOCYTIC LEUKEMIA (MULTI): ICD-10-CM

## 2024-05-15 ENCOUNTER — HOSPITAL ENCOUNTER (OUTPATIENT)
Dept: RADIOLOGY | Facility: HOSPITAL | Age: 64
Discharge: HOME | End: 2024-05-15
Payer: COMMERCIAL

## 2024-05-15 DIAGNOSIS — C91.Z0 LARGE GRANULAR LYMPHOCYTIC LEUKEMIA (MULTI): ICD-10-CM

## 2024-05-15 DIAGNOSIS — R16.1 SPLENOMEGALY: Primary | ICD-10-CM

## 2024-05-17 ENCOUNTER — LAB (OUTPATIENT)
Dept: LAB | Facility: LAB | Age: 64
End: 2024-05-17
Payer: COMMERCIAL

## 2024-05-17 ENCOUNTER — PREP FOR PROCEDURE (OUTPATIENT)
Dept: RADIOLOGY | Facility: HOSPITAL | Age: 64
End: 2024-05-17
Payer: COMMERCIAL

## 2024-05-17 DIAGNOSIS — R16.1 SPLENOMEGALY: ICD-10-CM

## 2024-05-17 DIAGNOSIS — R16.1 SPLENOMEGALY: Primary | ICD-10-CM

## 2024-05-17 LAB
CREAT SERPL-MCNC: 0.57 MG/DL (ref 0.5–1.05)
EGFRCR SERPLBLD CKD-EPI 2021: >90 ML/MIN/1.73M*2

## 2024-05-17 PROCEDURE — 82565 ASSAY OF CREATININE: CPT

## 2024-05-17 PROCEDURE — 36415 COLL VENOUS BLD VENIPUNCTURE: CPT

## 2024-05-28 ENCOUNTER — TELEPHONE (OUTPATIENT)
Dept: HEMATOLOGY/ONCOLOGY | Facility: HOSPITAL | Age: 64
End: 2024-05-28
Payer: COMMERCIAL

## 2024-05-28 DIAGNOSIS — G89.3 CANCER RELATED PAIN: ICD-10-CM

## 2024-05-28 RX ORDER — OXYCODONE HYDROCHLORIDE 10 MG/1
10 TABLET ORAL EVERY 4 HOURS PRN
Qty: 180 TABLET | Refills: 0 | Status: SHIPPED | OUTPATIENT
Start: 2024-05-28 | End: 2024-06-27

## 2024-05-28 NOTE — TELEPHONE ENCOUNTER
OARRS report reviewed and reflects  prescription history, no aberrancy noted. Per OARRS, patient last filled Oxycodone 10 Q4 hours  #180/30 day supply on 4/30/24. Per last visit with Milly Barron NP  on 4/30/24 patient to continue above. Patient with follow up visit scheduled  on 7/23/24. Refill request routed to provider.

## 2024-05-28 NOTE — TELEPHONE ENCOUNTER
Pt requesting a refill of oxycodone 10mg q4 prn, #180.  Preferred pharmacy is [facility] on [address].

## 2024-06-04 ENCOUNTER — APPOINTMENT (OUTPATIENT)
Dept: RADIOLOGY | Facility: HOSPITAL | Age: 64
End: 2024-06-04
Payer: COMMERCIAL

## 2024-06-27 ENCOUNTER — TELEPHONE (OUTPATIENT)
Dept: ADMISSION | Facility: HOSPITAL | Age: 64
End: 2024-06-27
Payer: COMMERCIAL

## 2024-06-27 DIAGNOSIS — G89.3 CANCER RELATED PAIN: ICD-10-CM

## 2024-06-27 RX ORDER — OXYCODONE HYDROCHLORIDE 10 MG/1
10 TABLET ORAL EVERY 4 HOURS PRN
Qty: 180 TABLET | Refills: 0 | Status: SHIPPED | OUTPATIENT
Start: 2024-06-27 | End: 2024-07-27

## 2024-06-27 NOTE — TELEPHONE ENCOUNTER
Pt called requesting a refill on her Oxycodone 10mg to be sent to [facility] on file. Message sent to the [facility] care team.

## 2024-06-27 NOTE — TELEPHONE ENCOUNTER
OARRS reviewed. Patient last filled oxycodone 10 mg #180/30 days on 5/29. Patient due for fill. RX submitted.

## 2024-07-23 ENCOUNTER — APPOINTMENT (OUTPATIENT)
Dept: PALLIATIVE MEDICINE | Facility: CLINIC | Age: 64
End: 2024-07-23
Payer: COMMERCIAL

## 2024-07-23 ENCOUNTER — TELEPHONE (OUTPATIENT)
Dept: PALLIATIVE MEDICINE | Facility: HOSPITAL | Age: 64
End: 2024-07-23
Payer: COMMERCIAL

## 2024-07-23 NOTE — TELEPHONE ENCOUNTER
Spoke with Elvira after leaving a message for Grace informing her  that today's appointment with Milly Barron NP has been cancelled. They will call to reschedule.

## 2024-08-02 ENCOUNTER — TELEPHONE (OUTPATIENT)
Dept: ADMISSION | Facility: HOSPITAL | Age: 64
End: 2024-08-02
Payer: COMMERCIAL

## 2024-08-02 DIAGNOSIS — G89.3 CANCER RELATED PAIN: ICD-10-CM

## 2024-08-02 RX ORDER — OXYCODONE HYDROCHLORIDE 10 MG/1
10 TABLET ORAL EVERY 4 HOURS PRN
Qty: 180 TABLET | Refills: 0 | Status: SHIPPED | OUTPATIENT
Start: 2024-08-02 | End: 2024-09-01

## 2024-08-02 NOTE — TELEPHONE ENCOUNTER
Script pended to provider for oxycodone IR 10 mg every 4 hours 180/30.  OARRS reviewed.  Patient due for refill.  Patient called to see how her pain level was doing.  No answer.  Voicemail message left for patient to call back office for update.  Patient to follow up with Milly Barron 8/5.

## 2024-08-05 ENCOUNTER — APPOINTMENT (OUTPATIENT)
Dept: PALLIATIVE MEDICINE | Facility: CLINIC | Age: 64
End: 2024-08-05
Payer: COMMERCIAL

## 2024-08-06 ENCOUNTER — APPOINTMENT (OUTPATIENT)
Dept: PALLIATIVE MEDICINE | Facility: CLINIC | Age: 64
End: 2024-08-06
Payer: COMMERCIAL

## 2024-08-13 ENCOUNTER — APPOINTMENT (OUTPATIENT)
Dept: PALLIATIVE MEDICINE | Facility: CLINIC | Age: 64
End: 2024-08-13
Payer: COMMERCIAL

## 2024-08-19 ENCOUNTER — OFFICE VISIT (OUTPATIENT)
Dept: PALLIATIVE MEDICINE | Facility: CLINIC | Age: 64
End: 2024-08-19
Payer: COMMERCIAL

## 2024-08-19 VITALS
SYSTOLIC BLOOD PRESSURE: 135 MMHG | WEIGHT: 119.8 LBS | HEART RATE: 98 BPM | TEMPERATURE: 96.6 F | DIASTOLIC BLOOD PRESSURE: 83 MMHG | OXYGEN SATURATION: 98 % | BODY MASS INDEX: 24.81 KG/M2 | RESPIRATION RATE: 16 BRPM

## 2024-08-19 DIAGNOSIS — Z51.81 ENCOUNTER FOR MONITORING OPIOID MAINTENANCE THERAPY: Primary | ICD-10-CM

## 2024-08-19 DIAGNOSIS — C91.Z0 LARGE GRANULAR LYMPHOCYTIC LEUKEMIA (MULTI): ICD-10-CM

## 2024-08-19 DIAGNOSIS — Z79.891 ENCOUNTER FOR MONITORING OPIOID MAINTENANCE THERAPY: Primary | ICD-10-CM

## 2024-08-19 PROCEDURE — 99214 OFFICE O/P EST MOD 30 MIN: CPT

## 2024-08-19 PROCEDURE — 80365 DRUG SCREENING OXYCODONE: CPT

## 2024-08-19 PROCEDURE — 80307 DRUG TEST PRSMV CHEM ANLYZR: CPT

## 2024-08-19 RX ORDER — ONDANSETRON HYDROCHLORIDE 8 MG/1
8 TABLET, FILM COATED ORAL EVERY 8 HOURS
Qty: 30 TABLET | Refills: 3 | Status: SHIPPED | OUTPATIENT
Start: 2024-08-19

## 2024-08-19 ASSESSMENT — PAIN SCALES - GENERAL: PAINLEVEL: 4

## 2024-08-19 NOTE — PROGRESS NOTES
SUPPORTIVE AND PALLIATIVE ONCOLOGY OUTPATIENT FOLLOW-UP      SERVICE DATE: 8/19/2024    Subjective   HISTORY OF PRESENT ILLNESS: Grace Smith is a 64 y.o. 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 Oncology has discussed splenectomy versus radiation to decrease or eliminate the spleen completely.  This has been put on hold due to complications from cholecystectomy.  She had struggled with biliary leaks, which had her in and out of the hospital;  this has improved greatly. Methotrexate has been discontinued r/t side effects. Recently admitted due to sepsis and started on Rituximab.     Pain Assessment:  Pain Score: 5/10  Location:  abdominal pain, neck, back (chronic)  Description: increased pain in her abdomen since being in the hospital. The pain is aching, tight, and burning. She has been taking oxycodone 15 mg every 4 hours which brings her pain down from a 8/10 to a 3/10 which is tolerable.     Symptom Assessment:  Pain:a little  Headache: none  Dizziness:none  Lack of energy: a little  Difficulty sleeping: none  Worrying: none  Anxiety: none  Depression: none  Pain in mouth/swallowing: none  Dry mouth: none  Taste changes: none  Shortness of breath: none  Lack of appetite: none   Nausea: a little using ondansetron  Vomiting: none  Constipation: none  Diarrhea: a little using Imodium as needed   Sore muscles: none  Numbness or tingling in hands/feet/other: none  Weight loss: none    Information obtained from: interview of patient  ______________________________________________________________________        Objective        PHYSICAL EXAMINATION   Vital Signs:   Vitals:    08/19/24 1533   BP: 135/83   Pulse: 98   Resp: 16   Temp: 35.9 °C (96.6 °F)   SpO2: 98%   vital signs reviewed         Physical Exam  HENT:      Head: Normocephalic and atraumatic.      Nose: Nose normal.       Mouth/Throat:      Mouth: Mucous membranes are moist.      Pharynx: Oropharynx is clear.   Eyes:      Extraocular Movements: Extraocular movements intact.      Pupils: Pupils are equal, round, and reactive to light.   Pulmonary:      Effort: Pulmonary effort is normal.   Abdominal:      General: There is distension.      Palpations: There is splenomegaly.      Tenderness: There is no abdominal tenderness.      Comments: firm   Musculoskeletal:         General: No swelling. Normal range of motion.      Cervical back: Normal range of motion and neck supple.   Skin:     General: Skin is warm and dry.   Neurological:      General: No focal deficit present.      Mental Status: She is alert.   Psychiatric:         Mood and Affect: Mood normal.         Behavior: Behavior normal.       No visits with results within 1 Month(s) from this visit.   Latest known visit with results is:   Lab on 05/17/2024   Component Date Value Ref Range Status    Creatinine 05/17/2024 0.57  0.50 - 1.05 mg/dL Final    eGFR 05/17/2024 >90  >60 mL/min/1.73m*2 Final    Calculations of estimated GFR are performed using the 2021 CKD-EPI Study Refit equation without the race variable for the IDMS-Traceable creatinine methods.  https://jasn.asnjournals.org/content/early/2021/09/22/ASN.4259852746       ASSESSMENT/PLAN    Pain  Pain is: cancer related pain  Type: somatic  Pain control: well-controlled  Home regimen:   -Continue 300 mg gabapentin 2 times a day. Can't tolerate higher doses; grogginess does seem to be minimal now.  -Continue 10 mg oxycodone as needed every 4 hours.  OK to use 1.5 tabs every 4 hours.   Intolerances/previously tried:   -Was previously seen by chronic pain and would not like to go back.     Opioid Use  Medication Management:   - OARRS report reviewed with no aberrant behavior; consistent with  prescriptions/records and patient history  - MED 60-75.  Overdose Risk Score 220.   This has been discussed with patient.   - We will  continue to closely monitor the patient for signs of prescription misuse including UDS, OARRS review and subjective reports at each visit.  - No concurrent benzodiazepine use   - I am a provider who either is or has consulted and collaborated with a provider certified in Hospice and Palliative Medicine and have conducted a face-face visit and examination for this patient.  - Routine Urine Drug Screen completed 8/19/24  - Controlled Substance Agreement completed 4/30/24  - Specifically discussed that controlled substance prescriptions will only be provided by our group as outlined in the completed agreement  - Prescribed naloxone at hospital discharge in 2/2022  - Red Flags: None      Nausea  At risk for nausea without vomiting related to opioids   -Continue Zofran 8 mg every as needed.      Constipation  At risk for constipation related to opioids,  currently not constipated  Usual bowel pattern: daily  Home regimen:   -Continue Senna-S as needed  -Continue Imodium 2 mg with each episode of diarrhea - do not exceed 16 mg/day     Sleep:  - Gabapentin is effective for sleep     Supportive and Palliative Oncology encounter:  Spoke with patient at bedside  Emotional support provided  Coordination of care  We will continue to follow and address symptoms as needed     Advance Directives  Existence of Advance Directives:Unknown  Decision maker: Surrogate decision maker is daughter Elvira  Code Status: Full code     Next Follow-Up Visit:  Return to clinic in 3 months     Signature and billing  Medical complexity was moderate level due to due to complexity of problems, extensive data review, and high risk of management/treatment.  Time was spent on the following: Prep Time, Time Directly with Patient/Family/Caregiver, Documentation Time. Total time spent: 30 minutes     Data  Diagnostic tests and information reviewed for today's visit:  Most recent labs and imaging results       Some elements copied from Supportive  Oncology note on 4/30/24, the elements have been updated and all reflect current decision making from today, 8/19/2024.      Plan of Care discussed with: Patient and RN    I have personally seen and examined the patient and performed the medical decision-making components.  I have reviewed the Advanced Practice Registered Nurse (APRN) orientee's documentation and verified the findings in the note as written. Edits/additions made where necessary.     SIGNATURE: CONOR Mensah-CNP    Contact information:  Supportive and Palliative Oncology  Monday-Friday 8 AM-5 PM  Phone:  [phone], press option #5, then option #1.   Or Epic Secure Chat

## 2024-08-20 LAB
AMPHETAMINES UR QL SCN: ABNORMAL
BARBITURATES UR QL SCN: ABNORMAL
BENZODIAZ UR QL SCN: ABNORMAL
BZE UR QL SCN: ABNORMAL
CANNABINOIDS UR QL SCN: ABNORMAL
FENTANYL+NORFENTANYL UR QL SCN: ABNORMAL
METHADONE UR QL SCN: ABNORMAL
OPIATES UR QL SCN: ABNORMAL
OXYCODONE+OXYMORPHONE UR QL SCN: ABNORMAL
PCP UR QL SCN: ABNORMAL

## 2024-08-23 ENCOUNTER — TELEPHONE (OUTPATIENT)
Dept: HEMATOLOGY/ONCOLOGY | Facility: HOSPITAL | Age: 64
End: 2024-08-23
Payer: COMMERCIAL

## 2024-08-23 DIAGNOSIS — G89.3 CANCER RELATED PAIN: ICD-10-CM

## 2024-08-23 LAB
6MAM UR CFM-MCNC: <25 NG/ML
CODEINE UR CFM-MCNC: <50 NG/ML
HYDROCODONE CTO UR CFM-MCNC: <25 NG/ML
HYDROMORPHONE UR CFM-MCNC: <25 NG/ML
MORPHINE UR CFM-MCNC: <50 NG/ML
NORHYDROCODONE UR CFM-MCNC: <25 NG/ML
NOROXYCODONE UR CFM-MCNC: >1000 NG/ML
OXYCODONE UR CFM-MCNC: >2500 NG/ML
OXYMORPHONE UR CFM-MCNC: 2345 NG/ML

## 2024-08-23 RX ORDER — OXYCODONE HYDROCHLORIDE 10 MG/1
10 TABLET ORAL EVERY 4 HOURS PRN
Qty: 180 TABLET | Refills: 0 | Status: SHIPPED | OUTPATIENT
Start: 2024-08-23 | End: 2024-09-22

## 2024-08-23 NOTE — TELEPHONE ENCOUNTER
Med refill pended to provider for oxycodone IR 10 mg every 4 hours 180/30.  OARRS reviewed.  Medication due for refill 8/31.  Patient to follow up with Milly Barron 11/18.

## 2024-08-23 NOTE — TELEPHONE ENCOUNTER
Refill request received for Oxycodone 10mg.  Preferred pharmacy is [facility] at [address] in [place].  Message sent to Palliative Care team.

## 2024-09-09 ENCOUNTER — TELEPHONE (OUTPATIENT)
Dept: PALLIATIVE MEDICINE | Facility: HOSPITAL | Age: 64
End: 2024-09-09
Payer: COMMERCIAL

## 2024-09-09 DIAGNOSIS — G89.3 CANCER RELATED PAIN: ICD-10-CM

## 2024-09-09 RX ORDER — GABAPENTIN 300 MG/1
300 CAPSULE ORAL 2 TIMES DAILY
Qty: 60 CAPSULE | Refills: 2 | Status: SHIPPED | OUTPATIENT
Start: 2024-09-09

## 2024-09-09 NOTE — TELEPHONE ENCOUNTER
Received fax from [facility] that refill is needed for Gabapentin. Per last visit notes with Provider Beatrice ATKINS on 8/19/24, patient is to continue Gabapentin 300 MG BID. Script pended to provider for approval.

## 2024-10-25 ENCOUNTER — PATIENT MESSAGE (OUTPATIENT)
Dept: PALLIATIVE MEDICINE | Facility: CLINIC | Age: 64
End: 2024-10-25
Payer: COMMERCIAL

## 2024-10-25 DIAGNOSIS — G89.3 CANCER RELATED PAIN: ICD-10-CM

## 2024-10-25 RX ORDER — OXYCODONE HYDROCHLORIDE 10 MG/1
10-15 TABLET ORAL EVERY 4 HOURS PRN
Qty: 270 TABLET | Refills: 0 | Status: SHIPPED | OUTPATIENT
Start: 2024-10-25 | End: 2024-11-24

## 2024-11-18 ENCOUNTER — APPOINTMENT (OUTPATIENT)
Dept: PALLIATIVE MEDICINE | Facility: CLINIC | Age: 64
End: 2024-11-18
Payer: COMMERCIAL

## 2024-11-22 ENCOUNTER — TELEPHONE (OUTPATIENT)
Dept: HEMATOLOGY/ONCOLOGY | Facility: HOSPITAL | Age: 64
End: 2024-11-22
Payer: COMMERCIAL

## 2024-11-22 DIAGNOSIS — G89.3 CANCER RELATED PAIN: ICD-10-CM

## 2024-11-22 RX ORDER — OXYCODONE HYDROCHLORIDE 10 MG/1
10-15 TABLET ORAL EVERY 4 HOURS PRN
Qty: 270 TABLET | Refills: 0 | Status: SHIPPED | OUTPATIENT
Start: 2024-11-22 | End: 2024-12-22

## 2024-11-22 NOTE — TELEPHONE ENCOUNTER
Refill request received for Oxycodone 10mg.  Preferred pharmacy is [facility] at [address] in [place].  Message sent to Palliative Care team.

## 2024-11-26 ENCOUNTER — APPOINTMENT (OUTPATIENT)
Dept: PALLIATIVE MEDICINE | Facility: CLINIC | Age: 64
End: 2024-11-26
Payer: COMMERCIAL

## 2024-12-03 ENCOUNTER — OFFICE VISIT (OUTPATIENT)
Dept: PALLIATIVE MEDICINE | Facility: CLINIC | Age: 64
End: 2024-12-03
Payer: COMMERCIAL

## 2024-12-03 VITALS
BODY MASS INDEX: 25.93 KG/M2 | OXYGEN SATURATION: 98 % | HEART RATE: 83 BPM | RESPIRATION RATE: 18 BRPM | TEMPERATURE: 96.8 F | SYSTOLIC BLOOD PRESSURE: 126 MMHG | WEIGHT: 125.2 LBS | DIASTOLIC BLOOD PRESSURE: 84 MMHG

## 2024-12-03 DIAGNOSIS — R11.2 NAUSEA AND VOMITING, UNSPECIFIED VOMITING TYPE: ICD-10-CM

## 2024-12-03 DIAGNOSIS — G89.3 CHRONIC PAIN DUE TO NEOPLASM: ICD-10-CM

## 2024-12-03 DIAGNOSIS — C91.Z0 LARGE GRANULAR LYMPHOCYTIC LEUKEMIA (MULTI): ICD-10-CM

## 2024-12-03 DIAGNOSIS — Z51.5 PALLIATIVE CARE ENCOUNTER: ICD-10-CM

## 2024-12-03 DIAGNOSIS — R19.7 DIARRHEA, UNSPECIFIED TYPE: Primary | ICD-10-CM

## 2024-12-03 PROCEDURE — 99214 OFFICE O/P EST MOD 30 MIN: CPT

## 2024-12-03 PROCEDURE — 1036F TOBACCO NON-USER: CPT

## 2024-12-03 RX ORDER — LOPERAMIDE HCL 2 MG
2 TABLET ORAL EVERY 4 HOURS PRN
Qty: 180 TABLET | Refills: 3 | Status: SHIPPED | OUTPATIENT
Start: 2024-12-03 | End: 2025-01-02

## 2024-12-03 ASSESSMENT — PAIN SCALES - GENERAL: PAINLEVEL_OUTOF10: 6

## 2024-12-03 NOTE — PROGRESS NOTES
SUPPORTIVE AND PALLIATIVE ONCOLOGY OUTPATIENT FOLLOW-UP      SERVICE DATE: 12/3/2024    Subjective   HISTORY OF PRESENT ILLNESS: Grace Smith is a 64 y.o. 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 Oncology has discussed splenectomy versus radiation to decrease or eliminate the spleen completely.  This has been put on hold due to complications from cholecystectomy.  She had struggled with biliary leaks, which had her in and out of the hospital;  this has improved greatly. Methotrexate has been discontinued r/t side effects. Recently admitted due to sepsis and started on Rituximab.     Pain Assessment:  Pain Score: 5/10  Location:  abdominal pain, neck, back (chronic)  Description: abdominal and back pain. The pain is aching, tight, and burning. She has been taking oxycodone 15 mg every 4 hours which brings her pain down from a 8/10 to a 3/10 which is tolerable. She states that the increase to 15 mg has been very effective.     Symptom Assessment:  Pain:a little  Headache: none  Dizziness:none  Lack of energy: a little  Difficulty sleeping: none  Worrying: a little  Anxiety: a little due to personal stressors   Depression: none  Pain in mouth/swallowing: none  Dry mouth: none  Taste changes: none  Shortness of breath: none  Lack of appetite: none   Nausea: a little using ondansetron every 1-2 days   Vomiting: none  Constipation: none  Diarrhea: a little using Imodium as needed   Sore muscles: none  Numbness or tingling in hands/feet/other: none  Weight loss: none    Information obtained from: interview of patient  ______________________________________________________________________        Objective     PHYSICAL EXAMINATION   Vital Signs:   Vitals:    12/03/24 1512   BP: 126/84   Pulse: 83   Resp: 18   Temp: 36 °C (96.8 °F)   SpO2: 98%   vital signs reviewed       Physical  Exam  HENT:      Head: Normocephalic and atraumatic.      Nose: Nose normal.      Mouth/Throat:      Mouth: Mucous membranes are moist.      Pharynx: Oropharynx is clear.   Eyes:      Extraocular Movements: Extraocular movements intact.      Pupils: Pupils are equal, round, and reactive to light.   Pulmonary:      Effort: Pulmonary effort is normal.   Abdominal:      General: There is distension.      Palpations: There is splenomegaly.      Tenderness: There is no abdominal tenderness.      Comments: firm   Musculoskeletal:         General: No swelling. Normal range of motion.      Cervical back: Normal range of motion and neck supple.   Skin:     General: Skin is warm and dry.   Neurological:      General: No focal deficit present.      Mental Status: She is alert.   Psychiatric:         Mood and Affect: Mood normal.         Behavior: Behavior normal.     No visits with results within 1 Month(s) from this visit.   Latest known visit with results is:   Office Visit on 08/19/2024   Component Date Value Ref Range Status    Amphetamine Screen, Urine 08/19/2024 Presumptive Negative  Presumptive Negative Final    CUTOFF LEVEL: 500 NG/ML   Cross-reactivity has been reported with high concentrations   of the following drugs: buproprion, chloroquine, chlorpromazine,   ephedrine, mephentermine, fenfluramine, phentermine,   phenylpropanolamine, pseudoephedrine, and propranolol.    Barbiturate Screen, Urine 08/19/2024 Presumptive Negative  Presumptive Negative Final    CUTOFF LEVEL: 200 NG/ML    Benzodiazepines Screen, Urine 08/19/2024 Presumptive Negative  Presumptive Negative Final    CUTOFF LEVEL: 200 NG/ML    Cannabinoid Screen, Urine 08/19/2024 Presumptive Negative  Presumptive Negative Final    CUTOFF LEVEL: 50 NG/ML    Cocaine Metabolite Screen, Urine 08/19/2024 Presumptive Negative  Presumptive Negative Final    CUTOFF LEVEL: 150 NG/ML    Fentanyl Screen, Urine 08/19/2024 Presumptive Negative  Presumptive Negative  Final    CUTOFF LEVEL: 5 NG/ML    Opiate Screen, Urine 08/19/2024 Presumptive Positive (A)  Presumptive Negative Final    CUTOFF LEVEL: 300 NG/ML  The opiate screen does not detect fentanyl, meperidine, or   tramadol. Oxycodone is not consistently detected (refer to  Oxycodone Screen, Urine result).    Oxycodone Screen, Urine 08/19/2024 Presumptive Positive (A)  Presumptive Negative Final    CUTOFF LEVEL: 100 NG/ML  This test will accurately detect both oxycodone and oxymorphone.    PCP Screen, Urine 08/19/2024 Presumptive Negative  Presumptive Negative Final    CUTOFF LEVEL:  25 NG/ML  Cross-reactivity has been reported with dextromethorphan.    Methadone Screen, Urine 08/19/2024 Presumptive Negative  Presumptive Negative Final    CUTOFF LEVEL: 150 NG/ML  The metabolite L-alpha-acetylmethadol (LAAM) is not  detected by this method in concentrations that would  be found in the urine of patients on LAAM therapy.    6-Acetylmorphine 08/19/2024 <25  <25 ng/mL Final    Codeine 08/19/2024 <50  <50 ng/mL Final    Hydrocodone 08/19/2024 <25  <25 ng/mL Final    Hydromorphone 08/19/2024 <25  <25 ng/mL Final    Morphine  08/19/2024 <50  <50 ng/mL Final    Norhydrocodone 08/19/2024 <25  <25 ng/mL Final    Noroxycodone 08/19/2024 >1,000 (H)  <25 ng/mL Final    Noroxycodone is a metabolite of oxycodone; consistent with use of a drug containing oxycodone.    Oxycodone 08/19/2024 >2,500 (H)  <25 ng/mL Final    Consistent with use of drug containing oxycodone.    Oxymorphone 08/19/2024 2,345 (H)  <25 ng/mL Final    Consistent with metabolism of oxycodone. May also reflect independent use of oxymorphone.       ASSESSMENT/PLAN    Pain  Pain is: cancer related pain  Type: somatic  Pain control: well-controlled  Home regimen:   -Continue 300 mg gabapentin 2 times a day. Can't tolerate higher doses; grogginess does seem to be minimal now.  -Continue 15 mg oxycodone as needed every 4 hours.    Intolerances/previously tried:   -Was  previously seen by chronic pain and would not like to go back.     Opioid Use  Medication Management:   - OARRS report reviewed with no aberrant behavior; consistent with  prescriptions/records and patient history  - MED 60-75.  Overdose Risk Score 220.   This has been discussed with patient.   - We will continue to closely monitor the patient for signs of prescription misuse including UDS, OARRS review and subjective reports at each visit.  - No concurrent benzodiazepine use   - I am a provider who either is or has consulted and collaborated with a provider certified in Hospice and Palliative Medicine and have conducted a face-face visit and examination for this patient.  - Routine Urine Drug Screen completed 8/19/24  - Controlled Substance Agreement completed 4/30/24  - Specifically discussed that controlled substance prescriptions will only be provided by our group as outlined in the completed agreement  - Prescribed naloxone at hospital discharge in 2/2022  - Red Flags: None      Nausea  At risk for nausea without vomiting related to opioids   -Continue Zofran 8 mg every as needed.      Constipation  At risk for constipation related to opioids,  currently not constipated  Usual bowel pattern: daily  Home regimen:   -Continue Senna-S as needed  -Continue Imodium 2 mg with each episode of diarrhea - do not exceed 16 mg/day     Sleep:  - Gabapentin is effective for sleep     Supportive and Palliative Oncology encounter:  Spoke with patient at bedside  Emotional support provided  Coordination of care  We will continue to follow and address symptoms as needed     Advance Directives  Existence of Advance Directives:Unknown  Decision maker: Surrogate decision maker is daughter Elvira  Code Status: Full code     Next Follow-Up Visit:  Return to clinic in 3 months     Signature and billing  Medical complexity was moderate level due to due to complexity of problems, extensive data review, and high risk of  management/treatment.  Time was spent on the following: Prep Time, Time Directly with Patient/Family/Caregiver, Documentation Time. Total time spent: 30 minutes     Data  Diagnostic tests and information reviewed for today's visit:  Most recent labs and imaging results       Some elements copied from Supportive Oncology note on 8/19/24, the elements have been updated and all reflect current decision making from today, 12/3/2024.      Plan of Care discussed with: Patient and RN    I have personally seen and examined the patient and performed the medical decision-making components.  I have reviewed the Advanced Practice Registered Nurse (APRN) orientee's documentation and verified the findings in the note as written. Edits/additions made where necessary.     SIGNATURE: CONRO Mensah-CNP    Contact information:  Supportive and Palliative Oncology  Monday-Friday 8 AM-5 PM  Phone:  [phone], press option #5, then option #1.   Or Epic Secure Chat

## 2024-12-18 ENCOUNTER — TELEPHONE (OUTPATIENT)
Dept: ADMISSION | Facility: HOSPITAL | Age: 64
End: 2024-12-18
Payer: COMMERCIAL

## 2024-12-18 DIAGNOSIS — G89.3 CANCER RELATED PAIN: ICD-10-CM

## 2024-12-18 RX ORDER — OXYCODONE HYDROCHLORIDE 10 MG/1
10-15 TABLET ORAL EVERY 4 HOURS PRN
Qty: 270 TABLET | Refills: 0 | Status: SHIPPED | OUTPATIENT
Start: 2024-12-18 | End: 2025-01-17

## 2024-12-18 NOTE — TELEPHONE ENCOUNTER
Refill Request  Oxycodone 10-15mg every 4 hrs PRN    Preferred Pharmacy  [id number] [place]

## 2024-12-18 NOTE — TELEPHONE ENCOUNTER
OARRS report reviewed and reflects  prescription history, no aberrancy noted. Per OARRS, patient last filled Oxycodone 10mg tab #270 tabs/30 day supply on 11/25/24 so will be due on 12/24/24. Per last visit with Milly Barron CNP on 12/3/24 patient to continue Oxycodone 15mg Q4H PRN. Patient with follow up visit scheduled with Milly Barron CNP on 3/4/25. Patient updated that medication will be sent to [facility]. Refill request routed to provider.

## 2024-12-27 DIAGNOSIS — G89.3 CANCER RELATED PAIN: ICD-10-CM

## 2024-12-27 RX ORDER — GABAPENTIN 300 MG/1
300 CAPSULE ORAL 2 TIMES DAILY
Qty: 60 CAPSULE | Refills: 2 | Status: SHIPPED | OUTPATIENT
Start: 2024-12-27

## 2024-12-27 NOTE — TELEPHONE ENCOUNTER
OARRS report reviewed and reflects  prescription history, no aberrancy noted. Per OARRS, patient last filled Gabapentin 300 mg 11/22/24, 30 day supply. Per last visit with Milly Barron 12/13/24 patient to continue medication. Patient with follow up visit scheduled with Milly 3/4/24. Patient updated that medication will be sent to [facility]. Refill request routed to provider.

## 2025-01-22 ENCOUNTER — TELEPHONE (OUTPATIENT)
Dept: ADMISSION | Facility: HOSPITAL | Age: 65
End: 2025-01-22
Payer: COMMERCIAL

## 2025-01-22 DIAGNOSIS — G89.3 CANCER RELATED PAIN: ICD-10-CM

## 2025-01-22 RX ORDER — OXYCODONE HYDROCHLORIDE 10 MG/1
10-15 TABLET ORAL EVERY 4 HOURS PRN
Qty: 270 TABLET | Refills: 0 | Status: SHIPPED | OUTPATIENT
Start: 2025-01-22 | End: 2025-02-21

## 2025-01-22 NOTE — TELEPHONE ENCOUNTER
Patient last seen by CONOR Barron on 12/3 with plan to continue oxycodone 15mg q4h PRN. Follow up visit is scheduled for 3/4. OARRS reviewed and no aberrancy noted. Patient last filled oxycodone 10mg #270/30 days on 12/24. Prescription pended to provider to approve.

## 2025-01-22 NOTE — TELEPHONE ENCOUNTER
Pt is requesting a refill of oxycodone 10mg, 1-1.5 tabs q4 prn, #270.  Last prescription was written 12/18/24.  Preferred pharmacy is [facility] in [place].

## 2025-02-20 ENCOUNTER — TELEPHONE (OUTPATIENT)
Dept: HEMATOLOGY/ONCOLOGY | Facility: HOSPITAL | Age: 65
End: 2025-02-20
Payer: COMMERCIAL

## 2025-02-20 DIAGNOSIS — G89.3 CANCER RELATED PAIN: ICD-10-CM

## 2025-02-20 RX ORDER — OXYCODONE HYDROCHLORIDE 10 MG/1
10-15 TABLET ORAL EVERY 4 HOURS PRN
Qty: 270 TABLET | Refills: 0 | Status: SHIPPED | OUTPATIENT
Start: 2025-02-20 | End: 2025-03-22

## 2025-02-20 NOTE — TELEPHONE ENCOUNTER
Refill request received for Oxycodone 10mg.  Preferred pharmacy is [facility] at [address] [address] [facility] in [place].  Message sent to Palliative Care team.

## 2025-02-20 NOTE — TELEPHONE ENCOUNTER
OARRS reviewed and no aberrancy noted. Prescription pended to provider to approve.  FUV 3/4 with Milly Barron.

## 2025-03-04 ENCOUNTER — APPOINTMENT (OUTPATIENT)
Dept: PALLIATIVE MEDICINE | Facility: CLINIC | Age: 65
End: 2025-03-04
Payer: COMMERCIAL

## 2025-03-09 ENCOUNTER — PATIENT MESSAGE (OUTPATIENT)
Dept: PALLIATIVE MEDICINE | Facility: CLINIC | Age: 65
End: 2025-03-09
Payer: COMMERCIAL

## 2025-03-09 DIAGNOSIS — C91.Z0 LARGE GRANULAR LYMPHOCYTIC LEUKEMIA (MULTI): ICD-10-CM

## 2025-03-10 RX ORDER — ONDANSETRON HYDROCHLORIDE 8 MG/1
8 TABLET, FILM COATED ORAL EVERY 8 HOURS PRN
Qty: 30 TABLET | Refills: 3 | Status: SHIPPED | OUTPATIENT
Start: 2025-03-10

## 2025-03-18 ENCOUNTER — APPOINTMENT (OUTPATIENT)
Dept: PALLIATIVE MEDICINE | Facility: CLINIC | Age: 65
End: 2025-03-18
Payer: COMMERCIAL